# Patient Record
Sex: FEMALE | Race: WHITE | NOT HISPANIC OR LATINO | Employment: FULL TIME | URBAN - METROPOLITAN AREA
[De-identification: names, ages, dates, MRNs, and addresses within clinical notes are randomized per-mention and may not be internally consistent; named-entity substitution may affect disease eponyms.]

---

## 2022-12-17 ENCOUNTER — OFFICE VISIT (OUTPATIENT)
Dept: URGENT CARE | Facility: CLINIC | Age: 42
End: 2022-12-17

## 2022-12-17 VITALS
OXYGEN SATURATION: 98 % | DIASTOLIC BLOOD PRESSURE: 76 MMHG | HEIGHT: 63 IN | SYSTOLIC BLOOD PRESSURE: 127 MMHG | WEIGHT: 200 LBS | BODY MASS INDEX: 35.44 KG/M2 | HEART RATE: 126 BPM | RESPIRATION RATE: 18 BRPM | TEMPERATURE: 101 F

## 2022-12-17 DIAGNOSIS — B34.9 VIRAL ILLNESS: Primary | ICD-10-CM

## 2022-12-17 DIAGNOSIS — R50.9 FEVER, UNSPECIFIED FEVER CAUSE: ICD-10-CM

## 2022-12-17 RX ORDER — ONDANSETRON 4 MG/1
4 TABLET, FILM COATED ORAL EVERY 8 HOURS PRN
Qty: 20 TABLET | Refills: 0 | Status: SHIPPED | OUTPATIENT
Start: 2022-12-17

## 2022-12-17 RX ORDER — OMEPRAZOLE 20 MG/1
CAPSULE, DELAYED RELEASE ORAL
COMMUNITY
Start: 2022-07-21

## 2022-12-17 RX ORDER — OSELTAMIVIR PHOSPHATE 75 MG/1
75 CAPSULE ORAL EVERY 12 HOURS SCHEDULED
Qty: 10 CAPSULE | Refills: 0 | Status: SHIPPED | OUTPATIENT
Start: 2022-12-17 | End: 2022-12-22

## 2022-12-17 RX ORDER — IBUPROFEN 400 MG/1
400 TABLET ORAL ONCE
Status: COMPLETED | OUTPATIENT
Start: 2022-12-17 | End: 2022-12-17

## 2022-12-17 RX ADMIN — IBUPROFEN 400 MG: 400 TABLET ORAL at 13:47

## 2022-12-17 NOTE — PROGRESS NOTES
[facility] [employer] [facility]        NAME: Elin Cortez is a 43 y o  female  : 1980    MRN: [id number]  DATE: 2022  TIME: 1:47 PM    Assessment and Orders   Fever, unspecified fever cause [R50 9]  1  Fever, unspecified fever cause  Covid/Flu-Office Collect    ibuprofen (MOTRIN) tablet 400 mg      2  Viral illness  oseltamivir (TAMIFLU) 75 mg capsule    ondansetron (ZOFRAN) 4 mg tablet            Plan and Discussion      Symptoms and exam consistent with viral illness  Likely Influenza A and since patient started having symptoms today, will treat with Tamiflu x 5 days  Zofran given for nausea  Risks and benefits discussed  Patient understands and agrees with the plan  Follow up with PCP  Chief Complaint     Chief Complaint   Patient presents with   • Fever     Started today with fever sore throat and vomiting headache          History of Present Illness       Fever  This is a new problem  The current episode started today  Associated symptoms include congestion, a fever, nausea, a sore throat and vomiting  Pertinent negatives include no coughing or urinary symptoms  She has tried acetaminophen for the symptoms  The treatment provided mild relief  Review of Systems   Review of Systems   Constitutional: Positive for fever  HENT: Positive for congestion and sore throat  Respiratory: Negative for cough  Gastrointestinal: Positive for nausea and vomiting           Current Medications       Current Outpatient Medications:   •  metFORMIN (GLUCOPHAGE) 500 mg tablet, , Disp: , Rfl:   •  omeprazole (PriLOSEC) 20 mg delayed release capsule, , Disp: , Rfl:   •  ondansetron (ZOFRAN) 4 mg tablet, Take 1 tablet (4 mg total) by mouth every 8 (eight) hours as needed for nausea or vomiting, Disp: 20 tablet, Rfl: 0  •  oseltamivir (TAMIFLU) 75 mg capsule, Take 1 capsule (75 mg total) by mouth every 12 (twelve) hours for 5 days, Disp: 10 capsule, Rfl: 0    Current Facility-Administered Medications:   •  ibuprofen (MOTRIN) tablet 400 mg, 400 mg, Oral, Once, Eve Nolasco DO    Current Allergies     Allergies as of 12/17/2022 - never reviewed   Allergen Reaction Noted   • Meloxicam Swelling 01/11/2019            The following portions of the patient's history were reviewed and updated as appropriate: allergies, current medications, past family history, past medical history, past social history, past surgical history and problem list      No past medical history on file  No past surgical history on file  No family history on file  Medications have been verified  Objective   /76   Pulse (!) 126   Temp (!) 101 °F (38 3 °C)   Resp 18   Ht 5' 3" (1 6 m)   Wt 90 7 kg (200 lb)   LMP 11/24/2022   SpO2 98%   BMI 35 43 kg/m²   Patient's last menstrual period was 11/24/2022  Physical Exam     Physical Exam  Constitutional:       Appearance: She is ill-appearing  HENT:      Head: Normocephalic and atraumatic  Right Ear: Tympanic membrane and external ear normal       Left Ear: Tympanic membrane and external ear normal       Nose: Congestion present  Mouth/Throat:      Pharynx: Posterior oropharyngeal erythema present  Cardiovascular:      Rate and Rhythm: Tachycardia present  Pulmonary:      Effort: Pulmonary effort is normal  No respiratory distress  Breath sounds: No wheezing or rhonchi  Neurological:      General: No focal deficit present  Mental Status: She is alert and oriented to person, place, and time     Psychiatric:         Mood and Affect: Mood normal          Behavior: Behavior normal                Korina Nolasco DO

## 2022-12-19 ENCOUNTER — TELEPHONE (OUTPATIENT)
Dept: URGENT CARE | Facility: CLINIC | Age: 42
End: 2022-12-19

## 2022-12-19 LAB
FLUAV RNA RESP QL NAA+PROBE: NEGATIVE
FLUBV RNA RESP QL NAA+PROBE: NEGATIVE
SARS-COV-2 RNA RESP QL NAA+PROBE: POSITIVE

## 2022-12-19 NOTE — TELEPHONE ENCOUNTER
Accepted a call from the patient  Was calling in regards to a voicemail left about positive lab results  Informed patient that she tested positive for COVID-19  Advised conservative management for symptoms

## 2023-09-25 ENCOUNTER — EVALUATION (OUTPATIENT)
Dept: PHYSICAL THERAPY | Facility: CLINIC | Age: 43
End: 2023-09-25
Payer: OTHER GOVERNMENT

## 2023-09-25 DIAGNOSIS — M17.11 PRIMARY OSTEOARTHRITIS OF RIGHT KNEE: Primary | ICD-10-CM

## 2023-09-25 PROCEDURE — 97161 PT EVAL LOW COMPLEX 20 MIN: CPT

## 2023-09-25 PROCEDURE — 97535 SELF CARE MNGMENT TRAINING: CPT

## 2023-09-25 NOTE — LETTER
2023    Merline Orem, MD  [address] [place] [address]    Patient: Noel Álvarez   YOB: 1980   Date of Visit: 2023     Encounter Diagnosis     ICD-10-CM    1. Primary osteoarthritis of right knee  M17.11           Dear Dr. Caden Ross:    Thank you for your recent referral of Noel Álvarez. Please review the attached evaluation summary from [address] [place] [address] recent visit. Please verify that you agree with the plan of care by signing the attached order. If you have any questions or concerns, please do not hesitate to call. I sincerely appreciate the opportunity to share in the care of one of your patients and hope to have another opportunity to work with you in the near future. Sincerely,    Fide Callahan, PT      Referring Provider:      I certify that I have read the below Plan of Care and certify the need for these services furnished under this plan of treatment while under my care. Merline Orem, MD  [address] [place] [address]  Via Fax: [phone]          PT Evaluation     Today's date: 2023  Patient name: Noel Álvarez  : 1980  MRN: [id number]  Referring provider: Merline Orem, MD  Dx:   Encounter Diagnosis     ICD-10-CM    1. Primary osteoarthritis of right knee  M17.11                      Assessment  Assessment details: Pt is a 37year old female who presents to OP PT s/p R TKR on 23. Upon examination, patient presents with decreased R knee ROM, decreased strength, poor quad contraction, and joint effusion. Due to her current impairments patient has difficulty with transfers, bed mobility, ambulation and functioning at prior level. Pt would benefit from OP PT services in order to address current impairments and functional limitations.  Thank you for your referral!    Impairments: abnormal gait, abnormal or restricted ROM, activity intolerance, impaired balance, impaired physical strength, lacks appropriate home exercise program and pain with function    Goals  STG (to be met within 4 weeks):  1. Pt will improve R knee pain to no more than 2/10 at worst in order to improve gait quality  2. Pt will improve R knee ROM by at least 15 * in order to normalize gait pattern  3. Pt will improve quadricep contraction to good in order to improve stability in SL stance  4. Pt will improve R knee strength by at least 1/2 grade in order to negotiate curb step  5. Pt will ambulate without AD in order to maximize independence  6. Pt to improve FOTO score by at least 10 points in order to progress to PLOF    LTG (to bet met in 10 weeks):  1. Pt will be able to perform all activities without R knee pain in order to maximize independence  2. Pt will restore WFL R knee ROM in order to perform all functional activities  3. Pt will restore WFL R knee strength in order to return to hobbies  4. Pt will be able to ascend/descend 1 flight of stairs with reciprocal gait pattern in order to return PLOF  5. Pt will be able to ambulate on uneven surfaces with least restrictive AD in order to ambulate in the community. 6. Pt to meet FOTO discharge score in order to improve QOL and maximize independence        Plan  Patient would benefit from: skilled physical therapy  Planned modality interventions: thermotherapy: hydrocollator packs and cryotherapy  Planned therapy interventions: joint mobilization, manual therapy, neuromuscular re-education, patient education, strengthening, stretching, therapeutic exercise, home exercise program and balance  Frequency: 2x week  Duration in weeks: 6  Treatment plan discussed with: patient        Subjective Evaluation    History of Present Illness  Mechanism of injury: Pt reports falling off her horse 2.5 years and landing on her feet. A few weeks later she developed R knee pain. Had 3 weeks of OPPT without improvements and was scheduled for MRI.  Underwent steroid injections which gave her fairly good relief for some time. Once these interventions stopped working, she had PRP injections without any relief. Updated imaging revealed severe joint space loss of medial side. She was then scheduled for elective TKR. Pain not well controlled initially post op and had BP/nausea issues. Since being home, she has been moving as much as she can and her pain is doing better. Patient Goals  Patient goals for therapy: increased strength, independence with ADLs/IADLs, return to sport/leisure activities, increased motion, improved balance, decreased pain, decreased edema and return to work  Patient goal: Return to riding horses  Pain  Current pain ratin  At best pain ratin  At worst pain rating: 3  Location: Medial patella  Quality: dull ache and pressure    Treatments  Current treatment: medication and physical therapy        Objective     Observations     Right Knee   Positive for edema and incision. Additional Observation Details  Incision closed with staples, no excessive drainage    Tenderness     Right Knee   Tenderness in the medial joint line and quadriceps tendon.      Neurological Testing     Sensation     Knee   Left Knee   Intact: light touch    Right Knee   Intact: light touch     Active Range of Motion   Left Knee   Normal active range of motion    Right Knee   Flexion: 24 degrees   Extension: 8 degrees     Passive Range of Motion     Right Knee   Flexion: 40 degrees   Extension: 5 degrees     Mobility   Patellar Mobility:     Right Knee   Hypomobile: medial, lateral, superior and inferior     Strength/Myotome Testing     Left Knee   Flexion: 4+  Extension: 4+  Quadriceps contraction: good    Right Knee   Flexion: 3-  Extension: 3-  Quadriceps contraction: poor    Swelling     Left Knee Girth Measurement (cm)   Joint line: 41 cm    Right Knee Girth Measurement (cm)   Joint line: 49 cm            Precautions: R TKR  POC Expiration: 10/25/23  Manuals        PROM        Patellar Mobility        Scar STM        Bilateral HS, hip ABD, piriformis, gastroc, and quad with manual hip traction                  Neuro Re-Ed        Quad set supine        QS c SLR        GS c bridge        Heel slide c OP                        TherEx        NuStep to improve ROM/cardiovasc endurance        Seated knee flexion        Pball DKTC        Knee flexion c FR @ wall        Knee flexion on step                                Instructed HEP & education 10'                       Gait Training                        Modalities

## 2023-09-25 NOTE — PROGRESS NOTES
PT Evaluation     Today's date: 2023  Patient name: Jimmie Nolan  : 1980  MRN: [id number]  Referring provider: Bobby Fraser MD  Dx:   Encounter Diagnosis     ICD-10-CM    1. Primary osteoarthritis of right knee  M17.11                      Assessment  Assessment details: Pt is a 37year old female who presents to OP PT s/p R TKR on 23. Upon examination, patient presents with decreased R knee ROM, decreased strength, poor quad contraction, and joint effusion. Due to her current impairments patient has difficulty with transfers, bed mobility, ambulation and functioning at prior level. Pt would benefit from OP PT services in order to address current impairments and functional limitations. Thank you for your referral!    Impairments: abnormal gait, abnormal or restricted ROM, activity intolerance, impaired balance, impaired physical strength, lacks appropriate home exercise program and pain with function    Goals  STG (to be met within 4 weeks):  1. Pt will improve R knee pain to no more than 2/10 at worst in order to improve gait quality  2. Pt will improve R knee ROM by at least 15 * in order to normalize gait pattern  3. Pt will improve quadricep contraction to good in order to improve stability in SL stance  4. Pt will improve R knee strength by at least 1/2 grade in order to negotiate curb step  5. Pt will ambulate without AD in order to maximize independence  6. Pt to improve FOTO score by at least 10 points in order to progress to PLOF    LTG (to bet met in 10 weeks):  1. Pt will be able to perform all activities without R knee pain in order to maximize independence  2. Pt will restore WFL R knee ROM in order to perform all functional activities  3. Pt will restore WFL R knee strength in order to return to hobbies  4. Pt will be able to ascend/descend 1 flight of stairs with reciprocal gait pattern in order to return PLOF  5.  Pt will be able to ambulate on uneven surfaces with least restrictive AD in order to ambulate in the community. 6. Pt to meet FOTO discharge score in order to improve QOL and maximize independence        Plan  Patient would benefit from: skilled physical therapy  Planned modality interventions: thermotherapy: hydrocollator packs and cryotherapy  Planned therapy interventions: joint mobilization, manual therapy, neuromuscular re-education, patient education, strengthening, stretching, therapeutic exercise, home exercise program and balance  Frequency: 2x week  Duration in weeks: 6  Treatment plan discussed with: patient        Subjective Evaluation    History of Present Illness  Mechanism of injury: Pt reports falling off her horse 2.5 years and landing on her feet. A few weeks later she developed R knee pain. Had 3 weeks of OPPT without improvements and was scheduled for MRI. Underwent steroid injections which gave her fairly good relief for some time. Once these interventions stopped working, she had PRP injections without any relief. Updated imaging revealed severe joint space loss of medial side. She was then scheduled for elective TKR. Pain not well controlled initially post op and had BP/nausea issues. Since being home, she has been moving as much as she can and her pain is doing better. Patient Goals  Patient goals for therapy: increased strength, independence with ADLs/IADLs, return to sport/leisure activities, increased motion, improved balance, decreased pain, decreased edema and return to work  Patient goal: Return to riding horses  Pain  Current pain ratin  At best pain ratin  At worst pain rating: 3  Location: Medial patella  Quality: dull ache and pressure    Treatments  Current treatment: medication and physical therapy        Objective     Observations     Right Knee   Positive for edema and incision.      Additional Observation Details  Incision closed with staples, no excessive drainage    Tenderness     Right Knee   Tenderness in the medial joint line and quadriceps tendon.      Neurological Testing     Sensation     Knee   Left Knee   Intact: light touch    Right Knee   Intact: light touch     Active Range of Motion   Left Knee   Normal active range of motion    Right Knee   Flexion: 24 degrees   Extension: 8 degrees     Passive Range of Motion     Right Knee   Flexion: 40 degrees   Extension: 5 degrees     Mobility   Patellar Mobility:     Right Knee   Hypomobile: medial, lateral, superior and inferior     Strength/Myotome Testing     Left Knee   Flexion: 4+  Extension: 4+  Quadriceps contraction: good    Right Knee   Flexion: 3-  Extension: 3-  Quadriceps contraction: poor    Swelling     Left Knee Girth Measurement (cm)   Joint line: 41 cm    Right Knee Girth Measurement (cm)   Joint line: 49 cm             Precautions: R TKR  POC Expiration: 10/25/23  Manuals 9/25       PROM        Patellar Mobility        Scar STM        Bilateral HS, hip ABD, piriformis, gastroc, and quad with manual hip traction                  Neuro Re-Ed        Quad set supine        QS c SLR        GS c bridge        Heel slide c OP                        TherEx        NuStep to improve ROM/cardiovasc endurance        Seated knee flexion        Pball DKTC        Knee flexion c FR @ wall        Knee flexion on step                                Instructed HEP & education 10'                       Gait Training                        Modalities

## 2023-09-27 NOTE — PROGRESS NOTES
Daily Note     Today's date: 2023  Patient name: Jethro Menendez  : 1980  MRN: [id number]  Referring provider: Thaddeus Guillen MD  Dx:   Encounter Diagnosis     ICD-10-CM    1. Primary osteoarthritis of right knee  M17.11                      Subjective: Pt reports consistently trying to be more active at home      Objective: See treatment diary below      Assessment:  Pt tolerated treatment session fairly well. R knee flexion ROM ~55* this session which is a significant improvement since IE. Fair quad contraction. Ambulating with SPC, adjusted appropriately and instructed in proper gait sequence. HEP updated. Pt would benefit from continued OP PT services. Plan: Continue per plan of care. Precautions: R TKR  POC Expiration: 10/25/23  Manuals       PROM  25'      Patellar Mobility        Scar STM        Bilateral HS, hip ABD, piriformis, gastroc, and quad with manual hip traction                  Neuro Re-Ed        Quad set prone  10x :05      QS c SLR        GS c bridge        Heel slide c OP                        TherEx        NuStep to improve ROM/cardiovasc endurance  10' L1      HR/TR  2x10 4" step      Pball DKTC        Step fwd, lat  6" 10x RLE only ea      Knee flexion on step                                Instructed HEP & education 10'                       Gait Training          Ambulaion with [facility] 10'              Modalities                 Access Code: [email or web address]  URL: [email or web address]  Date: 2023  Prepared by: Earlene Pardo    Exercises  - Prone Quadriceps Set  - 2 x daily - 7 x weekly - 2 sets - 10 reps - :05 hold  - Prone Knee Flexion AROM  - 2 x daily - 7 x weekly - 2 sets - 10 reps - :05 hold  - Standing Knee Flexion  - 2 x daily - 7 x weekly - 2 sets - 10 reps - :05 hold  - Standing Heel Raise  - 2 x daily - 7 x weekly - 2 sets - 10 reps

## 2023-09-28 ENCOUNTER — OFFICE VISIT (OUTPATIENT)
Dept: PHYSICAL THERAPY | Facility: CLINIC | Age: 43
End: 2023-09-28
Payer: OTHER GOVERNMENT

## 2023-09-28 DIAGNOSIS — M17.11 PRIMARY OSTEOARTHRITIS OF RIGHT KNEE: Primary | ICD-10-CM

## 2023-09-28 PROCEDURE — 97140 MANUAL THERAPY 1/> REGIONS: CPT

## 2023-09-28 PROCEDURE — 97110 THERAPEUTIC EXERCISES: CPT

## 2023-09-29 NOTE — PROGRESS NOTES
Daily Note     Today's date: 10/2/2023  Patient name: Karis Ahmadi  : 1980  MRN: [id number]  Referring provider: Dyan Medley MD  Dx:   Encounter Diagnosis     ICD-10-CM    1. Primary osteoarthritis of right knee  M17.11                      Subjective: Pt reports she has been trying to bend her knee more. She notes that she notices good and bad days      Objective: See treatment diary below      Assessment:  Pt tolerated treatment session fair. End range knee flexion continues to be limited due to painful muscle guarding. Session continuing to focus on improving knee flexion ROM and improving gait. Reviewed heel off during gait in order to promote greater ranges of knee fleixon. Pt would benefit from continued OP PT services. Plan: Continue per plan of care. Precautions: R TKR  POC Expiration: 10/25/23  Manuals 9/25 9/28 10/2     PROM  25' 25'     Patellar Mobility        Scar STM        Bilateral HS, hip ABD, piriformis, gastroc, and quad with manual hip traction                  Neuro Re-Ed   10/2     Quad set prone  10x :05 10x :05     QS c SLR        GS c bridge        Heel slide c OP                        TherEx   10/2     NuStep to improve ROM/cardiovasc endurance  10' L1 10'L1     HR/TR  2x10 4" step 2x10 4" step     Pball DKTC        Step fwd, lat  6" 10x RLE only ea 6" 10x RLE only ea     Knee flexion on step                                Instructed HEP & education 10'                       Gait Training   10/2       Ambulaion with [facility] 10' Ambulaion with [facility] 10'             Modalities    10/2             Access Code: [email or web address]  URL: [email or web address]  Date: 2023  Prepared by: Daryle Hives    Exercises  - Prone Quadriceps Set  - 2 x daily - 7 x weekly - 2 sets - 10 reps - :05 hold  - Prone Knee Flexion AROM  - 2 x daily - 7 x weekly - 2 sets - 10 reps - :05 hold  - Standing Knee Flexion  - 2 x daily - 7 x weekly - 2 sets - 10 reps - :05 hold  - Standing Heel Raise  - 2 x daily - 7 x weekly - 2 sets - 10 reps

## 2023-10-02 ENCOUNTER — OFFICE VISIT (OUTPATIENT)
Dept: PHYSICAL THERAPY | Facility: CLINIC | Age: 43
End: 2023-10-02
Payer: OTHER GOVERNMENT

## 2023-10-02 DIAGNOSIS — M17.11 PRIMARY OSTEOARTHRITIS OF RIGHT KNEE: Primary | ICD-10-CM

## 2023-10-02 PROCEDURE — 97110 THERAPEUTIC EXERCISES: CPT

## 2023-10-02 PROCEDURE — 97140 MANUAL THERAPY 1/> REGIONS: CPT

## 2023-10-04 ENCOUNTER — APPOINTMENT (OUTPATIENT)
Dept: PHYSICAL THERAPY | Facility: CLINIC | Age: 43
End: 2023-10-04
Payer: OTHER GOVERNMENT

## 2023-10-06 ENCOUNTER — OFFICE VISIT (OUTPATIENT)
Dept: PHYSICAL THERAPY | Facility: CLINIC | Age: 43
End: 2023-10-06
Payer: OTHER GOVERNMENT

## 2023-10-06 DIAGNOSIS — M17.11 PRIMARY OSTEOARTHRITIS OF RIGHT KNEE: Primary | ICD-10-CM

## 2023-10-06 PROCEDURE — 97110 THERAPEUTIC EXERCISES: CPT

## 2023-10-06 PROCEDURE — 97140 MANUAL THERAPY 1/> REGIONS: CPT

## 2023-10-06 NOTE — PROGRESS NOTES
Daily Note     Today's date: 10/6/2023  Patient name: Maria Teresa Gilliam  : 1980  MRN: [id number]  Referring provider: Grace Espino MD  Dx:   Encounter Diagnosis     ICD-10-CM    1. Primary osteoarthritis of right knee  M17.11                      Subjective: ***      Objective: See treatment diary below      Assessment:  Pt tolerated treatment session ***. Plan: Continue per plan of care. Access Code: [email or web address]  URL: [email or web address]  Date: 2023  Prepared by: Caio Singh    Exercises  - Prone Quadriceps Set  - 2 x daily - 7 x weekly - 2 sets - 10 reps - :05 hold  - Prone Knee Flexion AROM  - 2 x daily - 7 x weekly - 2 sets - 10 reps - :05 hold  - Standing Knee Flexion  - 2 x daily - 7 x weekly - 2 sets - 10 reps - :05 hold  - Standing Heel Raise  - 2 x daily - 7 x weekly - 2 sets - 10 reps

## 2023-10-06 NOTE — LETTER
2023    Steva Gosselin, MD  [address] [place] [address]    Patient: Remington Villarreal   YOB: 1980   Date of Visit: 10/6/2023     Encounter Diagnosis     ICD-10-CM    1. Primary osteoarthritis of right knee  M17.11           Dear Dr. Marcell Yanez:    Thank you for your recent referral of Remington Villarreal. Please review the attached evaluation summary from [address] [place] [address] recent visit. Please verify that you agree with the plan of care by signing the attached order. If you have any questions or concerns, please do not hesitate to call. I sincerely appreciate the opportunity to share in the care of one of your patients and hope to have another opportunity to work with you in the near future. Sincerely,    Jorge A Graham, PT      Referring Provider:      I certify that I have read the below Plan of Care and certify the need for these services furnished under this plan of treatment while under my care. Steva Gosselin, MD  [address] [place] [address]  Via Fax: [phone]          PT Re-Evaluation     Today's date: 10/6/2023  Patient name: Remington Villarreal  : 1980  MRN: [id number]  Referring provider: Steva Gosselin, MD  Dx:   Encounter Diagnosis     ICD-10-CM    1. Primary osteoarthritis of right knee  M17.11                      Assessment  Assessment details: Pt has attended a total of 4 PT sessions and has made fair progress towards goals. She has made some progress regarding knee ROM, knee strength and quad strength. Pt continues to demonstrates decreased strength and ROM. Will consider increasing frequency to 3x/week if ROM does not significantly improve over the next week. Pt would benefit from continued OP PT services in order to address remaining deficits and limitations. Thank you!       Impairments: abnormal gait, abnormal or restricted ROM, activity intolerance, impaired balance, impaired physical strength, lacks appropriate home exercise program and pain with function    Goals  STG (to be met within 4 weeks):  1. Pt will improve R knee pain to no more than 2/10 at worst in order to improve gait quality  progressing  2. Pt will improve R knee ROM by at least 15 * in order to normalize gait pattern   progressing  3. Pt will improve quadricep contraction to good in order to improve stability in SL stance   progressing  4. Pt will improve R knee strength by at least 1/2 grade in order to negotiate curb step  met  5. Pt will ambulate without AD in order to maximize independence  met  6. Pt to improve FOTO score by at least 10 points in order to progress to PLOF   progressing    LTG (to bet met in 10 weeks):  1. Pt will be able to perform all activities without R knee pain in order to maximize independence progressing  2. Pt will restore WFL R knee ROM in order to perform all functional activities  progressing  3. Pt will restore WFL R knee strength in order to return to hobbies  progressing  4. Pt will be able to ascend/descend 1 flight of stairs with reciprocal gait pattern in order to return PLOF  progressing  5. Pt will be able to ambulate on uneven surfaces with least restrictive AD in order to ambulate in the community. progressing  6.  Pt to meet FOTO discharge score in order to improve QOL and maximize independence  progressing        Plan  Patient would benefit from: skilled physical therapy  Planned modality interventions: thermotherapy: hydrocollator packs and cryotherapy  Planned therapy interventions: joint mobilization, manual therapy, neuromuscular re-education, patient education, strengthening, stretching, therapeutic exercise, home exercise program and balance  Frequency: 2x week (Will consider 3x/week if ROM does not prgress to 90*)  Duration in weeks: 6  Treatment plan discussed with: patient        Subjective Evaluation    History of Present Illness  Mechanism of injury: Pt states she has been trying to bend the knee more at home. She has been ambulating without AD  Patient Goals  Patient goals for therapy: increased strength, independence with ADLs/IADLs, return to sport/leisure activities, increased motion, improved balance, decreased pain, decreased edema and return to work  Patient goal: Return to riding horses  Treatments  Current treatment: medication and physical therapy        Objective     Observations     Right Knee   Positive for incision. Additional Observation Details  Staples removed, incision closed    Tenderness     Right Knee   Tenderness in the medial joint line and quadriceps tendon.      Neurological Testing     Sensation     Knee   Left Knee   Intact: light touch    Right Knee   Intact: light touch     Active Range of Motion   Left Knee   Normal active range of motion    Right Knee   Flexion: 37 degrees   Extension: 6 degrees     Passive Range of Motion     Right Knee   Flexion: 64 degrees   Extension: 4 degrees     Mobility   Patellar Mobility:     Right Knee   WFL: medial, lateral, superior and inferior    Strength/Myotome Testing     Left Knee   Flexion: 4+  Extension: 4+  Quadriceps contraction: good    Right Knee   Flexion: 4-  Extension: 3+  Quadriceps contraction: fair    Swelling     Left Knee Girth Measurement (cm)   Joint line: 41 cm    Right Knee Girth Measurement (cm)   Joint line: 49 cm            Precautions: R TKR  POC Expiration: 10/25/23  Manuals 9/25 9/28 10/2 10/6    PROM  25' 25' 25'    Patellar Mobility        Scar STM        Bilateral HS, hip ABD, piriformis, gastroc, and quad with manual hip traction                  Neuro Re-Ed   10/2     Quad set prone  10x :05 10x :05     QS c SLR        GS c bridge        Heel slide c OP                        TherEx   10/2     NuStep to improve ROM/cardiovasc endurance  10' L1 10'L1 10'L1    HR/TR  2x10 4" step 2x10 4" step     Seated knee flexion    2x10 :05 hold    Step fwd, lat  6" 10x RLE only ea 6" 10x RLE only ea     Knee flexion on step    8" step 2x10 :05 hold    FR knee flexion    8" step 2x10 :05 hold                    Instructed HEP & education 10'                       Gait Training   10/2       Ambulaion with [facility] 10' Ambulaion with [facility] 10'             Modalities    10/2             Access Code: [email or web address]  URL: [email or web address]  Date: 09/28/2023  Prepared by: Mick Coombs    Exercises  - Prone Quadriceps Set  - 2 x daily - 7 x weekly - 2 sets - 10 reps - :05 hold  - Prone Knee Flexion AROM  - 2 x daily - 7 x weekly - 2 sets - 10 reps - :05 hold  - Standing Knee Flexion  - 2 x daily - 7 x weekly - 2 sets - 10 reps - :05 hold  - Standing Heel Raise  - 2 x daily - 7 x weekly - 2 sets - 10 reps

## 2023-10-06 NOTE — PROGRESS NOTES
PT Re-Evaluation     Today's date: 10/6/2023  Patient name: Lisa Smith  : 1980  MRN: [id number]  Referring provider: Delicia Garcia MD  Dx:   Encounter Diagnosis     ICD-10-CM    1. Primary osteoarthritis of right knee  M17.11                      Assessment  Assessment details: Pt has attended a total of 4 PT sessions and has made fair progress towards goals. She has made some progress regarding knee ROM, knee strength and quad strength. Pt continues to demonstrates decreased strength and ROM. Will consider increasing frequency to 3x/week if ROM does not significantly improve over the next week. Pt would benefit from continued OP PT services in order to address remaining deficits and limitations. Thank you! Impairments: abnormal gait, abnormal or restricted ROM, activity intolerance, impaired balance, impaired physical strength, lacks appropriate home exercise program and pain with function    Goals  STG (to be met within 4 weeks):  1. Pt will improve R knee pain to no more than 2/10 at worst in order to improve gait quality  progressing  2. Pt will improve R knee ROM by at least 15 * in order to normalize gait pattern   progressing  3. Pt will improve quadricep contraction to good in order to improve stability in SL stance   progressing  4. Pt will improve R knee strength by at least 1/2 grade in order to negotiate curb step  met  5. Pt will ambulate without AD in order to maximize independence  met  6. Pt to improve FOTO score by at least 10 points in order to progress to PLOF   progressing    LTG (to bet met in 10 weeks):  1. Pt will be able to perform all activities without R knee pain in order to maximize independence progressing  2. Pt will restore WFL R knee ROM in order to perform all functional activities  progressing  3. Pt will restore WFL R knee strength in order to return to hobbies  progressing  4.  Pt will be able to ascend/descend 1 flight of stairs with reciprocal gait pattern in order to return PLOF  progressing  5. Pt will be able to ambulate on uneven surfaces with least restrictive AD in order to ambulate in the community. progressing  6. Pt to meet FOTO discharge score in order to improve QOL and maximize independence  progressing        Plan  Patient would benefit from: skilled physical therapy  Planned modality interventions: thermotherapy: hydrocollator packs and cryotherapy  Planned therapy interventions: joint mobilization, manual therapy, neuromuscular re-education, patient education, strengthening, stretching, therapeutic exercise, home exercise program and balance  Frequency: 2x week (Will consider 3x/week if ROM does not prgress to 90*)  Duration in weeks: 6  Treatment plan discussed with: patient        Subjective Evaluation    History of Present Illness  Mechanism of injury: Pt states she has been trying to bend the knee more at home. She has been ambulating without AD  Patient Goals  Patient goals for therapy: increased strength, independence with ADLs/IADLs, return to sport/leisure activities, increased motion, improved balance, decreased pain, decreased edema and return to work  Patient goal: Return to riding horses  Treatments  Current treatment: medication and physical therapy        Objective     Observations     Right Knee   Positive for incision. Additional Observation Details  Staples removed, incision closed    Tenderness     Right Knee   Tenderness in the medial joint line and quadriceps tendon.      Neurological Testing     Sensation     Knee   Left Knee   Intact: light touch    Right Knee   Intact: light touch     Active Range of Motion   Left Knee   Normal active range of motion    Right Knee   Flexion: 37 degrees   Extension: 6 degrees     Passive Range of Motion     Right Knee   Flexion: 64 degrees   Extension: 4 degrees     Mobility   Patellar Mobility:     Right Knee   WFL: medial, lateral, superior and inferior    Strength/Myotome Testing     Left Knee Flexion: 4+  Extension: 4+  Quadriceps contraction: good    Right Knee   Flexion: 4-  Extension: 3+  Quadriceps contraction: fair    Swelling     Left Knee Girth Measurement (cm)   Joint line: 41 cm    Right Knee Girth Measurement (cm)   Joint line: 49 cm             Precautions: R TKR  POC Expiration: 10/25/23  Manuals 9/25 9/28 10/2 10/6    PROM  25' 25' 25'    Patellar Mobility        Scar STM        Bilateral HS, hip ABD, piriformis, gastroc, and quad with manual hip traction                  Neuro Re-Ed   10/2     Quad set prone  10x :05 10x :05     QS c SLR        GS c bridge        Heel slide c OP                        TherEx   10/2     NuStep to improve ROM/cardiovasc endurance  10' L1 10'L1 10'L1    HR/TR  2x10 4" step 2x10 4" step     Seated knee flexion    2x10 :05 hold    Step fwd, lat  6" 10x RLE only ea 6" 10x RLE only ea     Knee flexion on step    8" step 2x10 :05 hold    FR knee flexion    8" step 2x10 :05 hold                    Instructed HEP & education 10'                       Gait Training   10/2       Ambulaion with [facility] 10' Ambulaion with [facility] 10'             Modalities    10/2             Access Code: [email or web address]  URL: [email or web address]  Date: 09/28/2023  Prepared by: Peyton Reyes    Exercises  - Prone Quadriceps Set  - 2 x daily - 7 x weekly - 2 sets - 10 reps - :05 hold  - Prone Knee Flexion AROM  - 2 x daily - 7 x weekly - 2 sets - 10 reps - :05 hold  - Standing Knee Flexion  - 2 x daily - 7 x weekly - 2 sets - 10 reps - :05 hold  - Standing Heel Raise  - 2 x daily - 7 x weekly - 2 sets - 10 reps

## 2023-10-11 ENCOUNTER — OFFICE VISIT (OUTPATIENT)
Dept: PHYSICAL THERAPY | Facility: CLINIC | Age: 43
End: 2023-10-11
Payer: OTHER GOVERNMENT

## 2023-10-11 DIAGNOSIS — M17.11 PRIMARY OSTEOARTHRITIS OF RIGHT KNEE: Primary | ICD-10-CM

## 2023-10-11 PROCEDURE — 97110 THERAPEUTIC EXERCISES: CPT

## 2023-10-11 PROCEDURE — 97140 MANUAL THERAPY 1/> REGIONS: CPT

## 2023-10-11 NOTE — PROGRESS NOTES
Daily Note     Today's date: 10/11/2023  Patient name: Cherelle Solano  : 1980  MRN: [id number]  Referring provider: Shannon Mtz MD  Dx:   Encounter Diagnosis     ICD-10-CM    1. Primary osteoarthritis of right knee  M17.11                      Subjective: Patient reports to PT ready to begin her program.  Patient reports that she feels her R knee is moving better; however, feels like it is a slow process. Objective: See treatment diary below      Assessment: Tolerated treatment well. Patient exhibited good technique with therapeutic exercises and would benefit from continued PT to increase R knee ROM/strength and endurance to improve mobility and gait. Patient able to demonstrate increased R knee flexion c each subsequent visit. PROM R knee flexion today, 72 degrees, measured in prone position. Plan: Continue per plan of care. Precautions: R TKR  POC Expiration: 10/25/23  Manuals 9/25 9/28 10/2 10/6 10/11   PROM  25' 25' 25' 20' c contract relax technique   Patellar Mobility     5'   Scar STM        Bilateral HS, hip ABD, piriformis, gastroc, and quad with manual hip traction       5' R LE           Neuro Re-Ed   10/2  10/11   Quad set prone  10x :05 10x :05     QS c SLR        GS c bridge        Heel slide c OP        Towel Slide on Wall     3x10           TherEx   10/2  10/11   NuStep to improve ROM/cardiovasc endurance  10' L1 10'L1 10'L1 10'L1   HR/TR  2x10 4" step 2x10 4" step     Seated knee flexion    2x10 :05 hold Multiangle MREs 2x15   Step fwd, lat  6" 10x RLE only ea 6" 10x RLE only ea     Knee flexion on step    8" step 2x10 :05 hold 2x10 5"hold 8"step   FR knee flexion    8" step 2x10 :05 hold 5'                     Instructed HEP & education 10'                       Gait Training   10/2  10/11     [place] [facility]' Ambulaion with [facility] 10'             Modalities    10/2  10/11           Access Code: [email or web address]  URL: [email or web address]  Date: Patient Education     Mastitis  Mastitis occurs when breast tissue becomes swollen and inflamed. This is almost always due to infection. Mastitis most often affects breastfeeding women during the first 6 weeks after childbirth. For this reason, it’s also known as lactation mastitis. Infection may happen after a duct becomes clogged, causing milk to back up in the breast. Mastitis may also occur if bacteria enter the breast through small cracks in the nipple. (Less often, mastitis occurs in women who aren’t breastfeeding. If you have mastitis that is not due to breastfeeding, your healthcare provider will give you more information as needed. Treatment may include some of the same home care measures listed below.)  Mastitis may cause flu-like symptoms such as fever, aches, and fatigue. The affected breast may feel painful, warm, tender, firm, or swollen. The skin over the breast may be red (often in a wedge-shaped pattern). You may feel a burning a sensation when breastfeeding.  In most cases, mastitis can be treated with antibiotics. This should clear the infection. If treatment is delayed, a pocket of pus (abscess) can form in the breast tissue. A procedure may then be needed to drain the pus. In severe cases of infection, other treatments may be needed.  Home care  Breastfeeding  · It’s very important to keep the milk flowing from the infected breast. Continue breastfeeding from both breasts as usual. This will not hurt the baby. If this is too painful, use a breast pump to remove milk from the infected side. This can be fed to your baby or discarded. Note: If you don't continue to breastfeed or pump your breast, bacteria can grow in the milk that is left in your breast. This can make your infection worse.  · Tell your healthcare provider if you have problems with breastfeeding. He or she may suggest changes to your technique, if needed. You may also be referred to a lactation nurse or consultant for support with  09/28/2023  Prepared by: Retta Aase    Exercises  - Prone Quadriceps Set  - 2 x daily - 7 x weekly - 2 sets - 10 reps - :05 hold  - Prone Knee Flexion AROM  - 2 x daily - 7 x weekly - 2 sets - 10 reps - :05 hold  - Standing Knee Flexion  - 2 x daily - 7 x weekly - 2 sets - 10 reps - :05 hold  - Standing Heel Raise  - 2 x daily - 7 x weekly - 2 sets - 10 reps breastfeeding.  General care  · Take any medicines you’re prescribed as directed. If you’re taking antibiotics, be sure to complete all of the medicine even if you start to feel better. Over-the-counter pain medicines may also be recommended. Don’t use breast creams or other products or medicines without talking to your healthcare provider first. Note: If you’re concerned about taking medicines while breastfeeding, talk to your healthcare provider.  · Rest as often as needed. Also be sure to drink plenty of fluids.  · To help relieve pain and swelling, heat or ice may be used. Apply as often as directed by your provider.  ? Heat: Place a warm compress on the breast. Use a towel soaked in hot water, a heating pad, or a hot water bottle.  ? Cold: Place a cold compress on the breast. Use an ice pack or bag of ice wrapped in a thin towel. Never place a cold source directly on the skin.  Follow-up care  Follow up with your healthcare provider as advised.  When to seek medical advice  Call your healthcare provider right away if any of these occur:  · Fever of 100.4°F (38°C) or higher, or as directed by your provider  · Shaking chills  · Symptoms worsen or do not improve within 48 to 72 hours of starting treatment  · New symptoms develop  Date Last Reviewed: 7/30/2015  © 0561-9768 [employer]. [address] [place] [address], [place] [address]. All rights reserved. This information is not intended as a substitute for professional medical care. Always follow your healthcare professional's instructions.

## 2023-10-13 ENCOUNTER — OFFICE VISIT (OUTPATIENT)
Dept: PHYSICAL THERAPY | Facility: CLINIC | Age: 43
End: 2023-10-13
Payer: OTHER GOVERNMENT

## 2023-10-13 DIAGNOSIS — M17.11 PRIMARY OSTEOARTHRITIS OF RIGHT KNEE: Primary | ICD-10-CM

## 2023-10-13 PROCEDURE — 97140 MANUAL THERAPY 1/> REGIONS: CPT

## 2023-10-13 PROCEDURE — 97110 THERAPEUTIC EXERCISES: CPT

## 2023-10-13 PROCEDURE — 97112 NEUROMUSCULAR REEDUCATION: CPT

## 2023-10-13 NOTE — PROGRESS NOTES
Daily Note     Today's date: 10/13/2023  Patient name: Jessi Lazcano  : 1980  MRN: [id number]  Referring provider: Darcy Queen MD  Dx:   Encounter Diagnosis     ICD-10-CM    1. Primary osteoarthritis of right knee  M17.11                      Subjective: Patient motivated to get her R knee bending more as she is to return back to work . Objective: See treatment diary below      Assessment: Tolerated treatment well. Patient exhibited good technique with therapeutic exercises and would benefit from continued PT to increase R knee ROM/strength and endurance to improve mobility and gait. Patient was able to demonstrate increased R knee AROM today with the progression of her program.      Plan: Continue per plan of care.       Precautions: R TKR  POC Expiration: 10/25/23  Manuals 9/28 10/2 10/6 10/11 10/13   PROM 25' 25' 25' 20' c contract relax technique 20' c contract relax technique   Patellar Mobility    5' 5'   Scar STM        Bilateral HS, hip ABD, piriformis, gastroc, and quad with manual hip traction      5' R LE 5' RLE           Neuro Re-Ed  10/2  10/11 10/13   Quad set prone 10x :05 10x :05      QS c SLR     Fwd,Lat Lunge   10xea bilat   GS c bridge     SS c Squat 4x10'   Heel slide c OP        Towel Slide on Wall    3x10 5'           TherEx  10/2  10/11 10/13   NuStep to improve ROM/cardiovasc endurance 10' L1 10'L1 10'L1 10'L1 10'L1   HR/TR 2x10 4" step 2x10 4" step      Seated knee flexion   2x10 :05 hold Multiangle MREs 2x15 Multiangle MREs 2x15   Step fwd, lat 6" 10x RLE only ea 6" 10x RLE only ea      Knee flexion on step   8" step 2x10 :05 hold 2x10 5"hold 8"step 2x10 5"hold 14"step   FR knee flexion   8" step 2x10 :05 hold 5'      Leg Press      *NV           Instructed HEP & education                        Gait Training  10/2  10/11 10/13    Ambulaion with [facility] 10 Ambulaion with [facility] 10'              Modalities   10/2  10/11 10/13           Access Code: [email or web address]  URL: [email or web address]  Date: 09/28/2023  Prepared by: Fide Callahan    Exercises  - Prone Quadriceps Set  - 2 x daily - 7 x weekly - 2 sets - 10 reps - :05 hold  - Prone Knee Flexion AROM  - 2 x daily - 7 x weekly - 2 sets - 10 reps - :05 hold  - Standing Knee Flexion  - 2 x daily - 7 x weekly - 2 sets - 10 reps - :05 hold  - Standing Heel Raise  - 2 x daily - 7 x weekly - 2 sets - 10 reps

## 2023-10-17 ENCOUNTER — OFFICE VISIT (OUTPATIENT)
Dept: PHYSICAL THERAPY | Facility: CLINIC | Age: 43
End: 2023-10-17
Payer: OTHER GOVERNMENT

## 2023-10-17 DIAGNOSIS — M17.11 PRIMARY OSTEOARTHRITIS OF RIGHT KNEE: Primary | ICD-10-CM

## 2023-10-17 PROCEDURE — 97140 MANUAL THERAPY 1/> REGIONS: CPT

## 2023-10-17 PROCEDURE — 97112 NEUROMUSCULAR REEDUCATION: CPT

## 2023-10-17 PROCEDURE — 97110 THERAPEUTIC EXERCISES: CPT

## 2023-10-17 NOTE — PROGRESS NOTES
Daily Note     Today's date: 10/17/2023  Patient name: Radha Baltazar  : 1980  MRN: [id number]  Referring provider: Dory Kanner, MD  Dx:   Encounter Diagnosis     ICD-10-CM    1. Primary osteoarthritis of right knee  M17.11                      Subjective: Patient reports that she remains compliant with her HEP. Objective: See treatment diary below      Assessment: Tolerated treatment well. Patient exhibited good technique with therapeutic exercises and would benefit from continued PT to increase R knee ROM/strength and endurance to improve mobility and gait. Patient continues to demonstrate gains c her functional ROM as long as she can minimize her guarding. PROM R knee flexion 70* today in seated and prone. Plan: Continue per plan of care.       Precautions: R TKR  POC Expiration: 10/25/23  Manuals 10/2 10/6 10/11 10/13 10/17   PROM 25' 25' 20' c contract relax technique 20' c contract relax technique 15'   Patellar Mobility   5' 5'    Scar STM        Bilateral HS, hip ABD, piriformis, gastroc, and quad with manual hip traction     5' R LE 5' RLE            Neuro Re-Ed 10/2  10/11 10/13 10/17   Quad set prone 10x :05       Fwd,Lat Lunge    Fwd,Lat Lunge   10xea bilat 2x10ea bilat   Sidestep c Squat    SS c Squat 4x10' 6x10'   Heel slide c OP     [place] Walk 6x10'   Towel Slide on Wall   3x10 5'    Step Up & Over     10xbilat 6"step   [place] Squats     2x10   TherEx 10/2  10/11 10/13 10/17   NuStep to improve ROM/cardiovasc endurance 10'L1 10'L1 10'L1 10'L1 [address] 10' 1/2Revs   HR/TR 2x10 4" step       Seated knee flexion  2x10 :05 hold Multiangle MREs 2x15 Multiangle MREs 2x15    Step fwd, lat 6" 10x RLE only ea       Knee flexion on step  8" step 2x10 :05 hold 2x10 5"hold 8"step 2x10 5"hold 14"step    FR knee flexion  8" step 2x10 :05 hold 5'       Leg Press     *NV NV           Instructed HEP & education                        Gait Training 10/2  10/11 10/13 10/17    Ambulaion with [facility] 10' Modalities  10/2  10/11 10/13 10/17           Access Code: [email or web address]  URL: [email or web address]  Date: 09/28/2023  Prepared by: Matt Byrdifer    Exercises  - Prone Quadriceps Set  - 2 x daily - 7 x weekly - 2 sets - 10 reps - :05 hold  - Prone Knee Flexion AROM  - 2 x daily - 7 x weekly - 2 sets - 10 reps - :05 hold  - Standing Knee Flexion  - 2 x daily - 7 x weekly - 2 sets - 10 reps - :05 hold  - Standing Heel Raise  - 2 x daily - 7 x weekly - 2 sets - 10 reps

## 2023-10-19 ENCOUNTER — OFFICE VISIT (OUTPATIENT)
Dept: PHYSICAL THERAPY | Facility: CLINIC | Age: 43
End: 2023-10-19
Payer: OTHER GOVERNMENT

## 2023-10-19 DIAGNOSIS — M17.11 PRIMARY OSTEOARTHRITIS OF RIGHT KNEE: Primary | ICD-10-CM

## 2023-10-19 PROCEDURE — 97110 THERAPEUTIC EXERCISES: CPT

## 2023-10-19 PROCEDURE — 97140 MANUAL THERAPY 1/> REGIONS: CPT

## 2023-10-19 PROCEDURE — 97112 NEUROMUSCULAR REEDUCATION: CPT

## 2023-10-19 NOTE — PROGRESS NOTES
Daily Note     Today's date: 10/23/2023  Patient name: Flo Floyd  : 1980  MRN: [id number]  Referring provider: Kaiden Iraheta MD  Dx:   Encounter Diagnosis     ICD-10-CM    1. Primary osteoarthritis of right knee  M17.11                      Subjective: Patient reports that she is pleased with her progress; however, frustrated with how slowly she feels she is making the progress. Objective: See treatment diary below      Assessment: Tolerated treatment well. Patient exhibited good technique with therapeutic exercises and would benefit from continued PT to increase R knee ROM/strength and endurance to improve mobility and gait. Patient able to demonstrate and tolerate increase in R knee flexion today, 80 degress. Plan: Continue per plan of care.       Precautions: R TKR  POC Expiration: 23  Manuals 10/11 10/13 10/17 10/19 10/23   PROM 20' c contract relax technique 20' c contract relax technique 15' 15' 15'c STM   Patellar Mobility 5' 5'      Scar STM        Bilateral HS, hip ABD, piriformis, gastroc, and quad with manual hip traction   5' R LE 5' RLE              Neuro Re-Ed 10/11 10/13 10/17 10/19 10/23   Quad set prone        Fwd,Lat Lunge  Fwd,Lat Lunge   10xea bilat 2x10ea bilat 15xea bilat  2x10ea bilat   Sidestep c Squat  SS c Squat 4x10' 6x10'     Heel slide c OP   Monster Walk 6x10'     Towel Slide on Wall 3x10 5'      Step Up & Over   10xbilat 6"step 10xbilat 6"step    Irish Squats   2x10 2x10 c OP 2x10   BOSU March     2'   TherEx 10/11 10/13 10/17 10/19 10/23   NuStep to improve ROM/cardiovasc endurance 10'L1 10'L1 [facility] 10' evs [facility] 10' evs [facility] 10evs   HR/TR    [id number]  4"step [id number] 4"step   Seated knee flexion Multiangle MREs 2x15 Multiangle MREs 2x15      Step fwd, lat   2x10ea bilat 8"step 2x10ea bilat 8"step    Knee flexion on step 2x10 5"hold 8"step 2x10 5"hold 14"step  15x5" 14"step 15x5" 14"step   FR knee flexion 5'      Wall Sit c Tball 2x10 5"hold   Leg Press   *NV #DL 2x10            Instructed HEP & education                        Gait Training 10/11 10/13 10/17 10/19 10/23                   Modalities  10/11 10/13 10/17 10/19 10/23           Access Code: [email or web address]  URL: [email or web address]  Date: 09/28/2023  Prepared by: Antonia Babin    Exercises  - Prone Quadriceps Set  - 2 x daily - 7 x weekly - 2 sets - 10 reps - :05 hold  - Prone Knee Flexion AROM  - 2 x daily - 7 x weekly - 2 sets - 10 reps - :05 hold  - Standing Knee Flexion  - 2 x daily - 7 x weekly - 2 sets - 10 reps - :05 hold  - Standing Heel Raise  - 2 x daily - 7 x weekly - 2 sets - 10 reps

## 2023-10-19 NOTE — PROGRESS NOTES
Daily Note     Today's date: 10/19/2023  Patient name: Lisa Smith  : 1980  MRN: [id number]  Referring provider: Delicia Garcia MD  Dx:   Encounter Diagnosis     ICD-10-CM    1. Primary osteoarthritis of right knee  M17.11                      Subjective: Patient reports to PT ready and enthusiastic to begin her program.  Patient to f/u c  tomorrow. Objective: See treatment diary below      Assessment: Tolerated treatment well. Patient exhibited good technique with therapeutic exercises and would benefit from continued PT to increase R knee ROM/strength and endurance to improve mobility and gait. Patient able to tolerated increased PROM to R knee in supine c her R leg in stretch position off of mat table c 76* of R knee flexion. Plan: Continue per plan of care.       Precautions: R TKR  POC Expiration: 10/25/23  Manuals 10/6 10/11 10/13 10/17 10/19   PROM 25' 20' c contract relax technique 20' c contract relax technique 15' 15'   Patellar Mobility  5' 5'     Scar STM        Bilateral HS, hip ABD, piriformis, gastroc, and quad with manual hip traction    5' R LE 5' RLE             Neuro Re-Ed  10/11 10/13 10/17 10/19   Quad set prone        Fwd,Lat Lunge   Fwd,Lat Lunge   10xea bilat 2x10ea bilat 15xea bilat    Sidestep c Squat   SS c Squat 4x10' 6x10'    Heel slide c OP    Monster Walk 6x10'    Towel Slide on Wall  3x10 5'     Step Up & Over    10xbilat 6"step 10xbilat 6"step   [place] Squats    2x10 2x10 c OP   TherEx  10/11 10/13 10/17 10/19   NuStep to improve ROM/cardiovasc endurance 10'L1 10'L1 10'L1 [address] 10' 1/2Revs [address] 10' 12Revs   HR/TR     [id number]  4"step   Seated knee flexion 2x10 :05 hold Multiangle MREs 2x15 Multiangle MREs 2x15     Step fwd, lat    2x10ea bilat 8"step 2x10ea bilat 8"step   Knee flexion on step 8" step 2x10 :05 hold 2x10 5"hold 8"step 2x10 5"hold 14"step  15x5" 14"step   FR knee flexion 8" step 2x10 :05 hold 5'        Leg Press    *NV #DL 2x10 Instructed HEP & education                        Gait Training  10/11 10/13 10/17 10/19                   Modalities   10/11 10/13 10/17 10/19           Access Code: [email or web address]  URL: [email or web address]  Date: 09/28/2023  Prepared by: Anupama Scales    Exercises  - Prone Quadriceps Set  - 2 x daily - 7 x weekly - 2 sets - 10 reps - :05 hold  - Prone Knee Flexion AROM  - 2 x daily - 7 x weekly - 2 sets - 10 reps - :05 hold  - Standing Knee Flexion  - 2 x daily - 7 x weekly - 2 sets - 10 reps - :05 hold  - Standing Heel Raise  - 2 x daily - 7 x weekly - 2 sets - 10 reps

## 2023-10-20 ENCOUNTER — APPOINTMENT (OUTPATIENT)
Dept: PHYSICAL THERAPY | Facility: CLINIC | Age: 43
End: 2023-10-20
Payer: OTHER GOVERNMENT

## 2023-10-22 NOTE — PROGRESS NOTES
PT Re-Evaluation     Today's date: 10/23/2023  Patient name: Jethro Menendez  : 1980  MRN: [id number]  Referring provider: Tahddeus Guillen MD  Dx:   Encounter Diagnosis     ICD-10-CM    1. Primary osteoarthritis of right knee  M17.11           Start Time: 0800  Stop Time: 09  Total time in clinic (min): 65 minutes    Assessment  Assessment details: Pt has attended a total of 9 PT sessions and is slowly making progress towards goals. Her ROM is progressing but at a slower rate due to muscle guarding and pain levels. She responds much better to mobilization with movement techniques than passive ROM as she is unable to relax when passively moving the extremity. Discussed importance of continuing ROM HEP at home in order to continue making progress. Pt would benefit from continued OP PT services in order to address remaining deficits and limitations. Thank you! Impairments: abnormal gait, abnormal or restricted ROM, activity intolerance, impaired balance, impaired physical strength, lacks appropriate home exercise program and pain with function    Goals  STG (to be met within 4 weeks):  1. Pt will improve R knee pain to no more than 2/10 at worst in order to improve gait quality  partially met  2. Pt will improve R knee ROM by at least 15 * in order to normalize gait pattern  met  3. Pt will improve quadricep contraction to good in order to improve stability in SL stance  progressing  4. Pt will improve R knee strength by at least 1/2 grade in order to negotiate curb step  progressing  5. Pt will ambulate without AD in order to maximize independence  met  6. Pt to improve FOTO score by at least 10 points in order to progress to PLOF  met    LTG (to bet met in 10 weeks):  1. Pt will be able to perform all activities without R knee pain in order to maximize independence   progressing  2. Pt will restore WFL R knee ROM in order to perform all functional activities   progressing   3.  Pt will restore WFL R knee strength in order to return to hobbies  progressing  4. Pt will be able to ascend/descend 1 flight of stairs with reciprocal gait pattern in order to return PLOF  progressing  5. Pt will be able to ambulate on uneven surfaces with least restrictive AD in order to ambulate in the community. progressing  6. Pt to meet FOTO discharge score in order to improve QOL and maximize independence   progressing        Plan  Patient would benefit from: skilled physical therapy  Planned modality interventions: thermotherapy: hydrocollator packs and cryotherapy  Planned therapy interventions: joint mobilization, manual therapy, neuromuscular re-education, patient education, strengthening, stretching, therapeutic exercise, home exercise program and balance  Frequency: 2x week (Increasing frequency to 3x/week)  Duration in weeks: 6  Treatment plan discussed with: patient        Subjective Evaluation    History of Present Illness  Mechanism of injury: Pt reports that she has been consistently performing HEP at home. She is constantly attempting to regain her ROM but is limited in this due to pain. Patient Goals  Patient goals for therapy: increased strength, independence with ADLs/IADLs, return to sport/leisure activities, increased motion, improved balance, decreased pain, decreased edema and return to work  Patient goal: Return to riding horses  Treatments  Current treatment: medication and physical therapy        Objective     Observations     Right Knee   Positive for edema and incision. Additional Observation Details  Incision closed with staples, no excessive drainage    Tenderness     Right Knee   Tenderness in the medial joint line and quadriceps tendon.      Neurological Testing     Sensation     Knee   Left Knee   Intact: Light touch    Right Knee   Intact: light touch     Active Range of Motion   Left Knee   Normal active range of motion    Right Knee   Flexion: 68 degrees   Extension: 4 degrees     Passive Range of Motion     Right Knee   Flexion: 80 degrees   Extension: 1 degrees     Mobility   Patellar Mobility:     Right Knee   WFL: medial and lateral  Hypomobile: superior and inferior     Strength/Myotome Testing     Left Knee   Flexion: 4+  Extension: 4+  Quadriceps contraction: good    Right Knee   Flexion: 3+  Extension: 3+  Quadriceps contraction: fair             Precautions: R TKR  POC Expiration: 11/23/23

## 2023-10-23 ENCOUNTER — EVALUATION (OUTPATIENT)
Dept: PHYSICAL THERAPY | Facility: CLINIC | Age: 43
End: 2023-10-23
Payer: OTHER GOVERNMENT

## 2023-10-23 DIAGNOSIS — M17.11 PRIMARY OSTEOARTHRITIS OF RIGHT KNEE: Primary | ICD-10-CM

## 2023-10-23 PROCEDURE — 97140 MANUAL THERAPY 1/> REGIONS: CPT

## 2023-10-23 PROCEDURE — 97110 THERAPEUTIC EXERCISES: CPT

## 2023-10-23 PROCEDURE — 97112 NEUROMUSCULAR REEDUCATION: CPT

## 2023-10-23 NOTE — LETTER
2023    Mauricio Urbina MD  [address]    Patient: Kesha Delgado   YOB: 1980   Date of Visit: 10/23/2023     Encounter Diagnosis     ICD-10-CM    1. Primary osteoarthritis of right knee  M17.11           Dear Dr. Gloria Desir:    Thank you for your recent referral of [facility]. Please review the attached evaluation summary from [address] [place] [address] recent visit. Please verify that you agree with the plan of care by signing the attached order. If you have any questions or concerns, please do not hesitate to call. I sincerely appreciate the opportunity to share in the care of one of your patients and hope to have another opportunity to work with you in the near future. Sincerely,    Odessa Emerson, PT      Referring Provider:      I certify that I have read the below Plan of Care and certify the need for these services furnished under this plan of treatment while under my care. Mauricio Urbina MD  [address]  Via Fax: [phone]          Daily Note     Today's date: 10/23/2023  Patient name: Kesha Delgado  : 1980  MRN: [id number]  Referring provider: Mauricio Urbina MD  Dx:   Encounter Diagnosis     ICD-10-CM    1. Primary osteoarthritis of right knee  M17.11                      Subjective: Patient reports that she is pleased with her progress; however, frustrated with how slowly she feels she is making the progress. Objective: See treatment diary below      Assessment: Tolerated treatment well. Patient exhibited good technique with therapeutic exercises and would benefit from continued PT to increase R knee ROM/strength and endurance to improve mobility and gait. Patient able to demonstrate and tolerate increase in R knee flexion today, 80 degress. Plan: Continue per plan of care.       Precautions: R TKR  POC Expiration: 23  Manuals 10/11 10/13 10/17 10/19 10/23 PROM 20' c contract relax technique 20' c contract relax technique 15' 15' 15'c STM   Patellar Mobility 5' 5'      Scar STM        Bilateral HS, hip ABD, piriformis, gastroc, and quad with manual hip traction   5' R LE 5' RLE              Neuro Re-Ed 10/11 10/13 10/17 10/19 10/23   Quad set prone        Fwd,Lat Lunge  Fwd,Lat Lunge   10xea bilat 2x10ea bilat 15xea bilat  2x10ea bilat   Sidestep c Squat  SS c Squat 4x10' 6x10'     Heel slide c OP   Monster Walk 6x10'     Towel Slide on Wall 3x10 5'      Step Up & Over   10xbilat 6"step 10xbilat 6"step    [place] Squats   2x10 2x10 c OP 2x10   BOSU March     2'   TherEx 10/11 10/13 10/17 10/19 10/23   NuStep to improve ROM/cardiovasc endurance 10'L1 10'L1 [facility] 10' 1/2Revs [facility]   HR/TR    [id number]  4"step [id number] 4"step   Seated knee flexion Multiangle MREs 2x15 Multiangle MREs 2x15      Step fwd, lat   2x10ea bilat 8"step 2x10ea bilat 8"step    Knee flexion on step 2x10 5"hold 8"step 2x10 5"hold 14"step  15x5" 14"step 15x5" 14"step   FR knee flexion 5'      Wall Sit c Tball 2x10 5"hold   Leg Press   *NV #DL 2x10            Instructed HEP & education                        Gait Training 10/11 10/13 10/17 10/19 10/23                   Modalities  10/11 10/13 10/17 10/19 10/23           Access Code: [email or web address]  URL: [email or web address]  Date: 09/28/2023  Prepared by: Antonia Babin    Exercises  - Prone Quadriceps Set  - 2 x daily - 7 x weekly - 2 sets - 10 reps - :05 hold  - Prone Knee Flexion AROM  - 2 x daily - 7 x weekly - 2 sets - 10 reps - :05 hold  - Standing Knee Flexion  - 2 x daily - 7 x weekly - 2 sets - 10 reps - :05 hold  - Standing Heel Raise  - 2 x daily - 7 x weekly - 2 sets - 10 reps                     Attestation signed by Antonia Babin PT at 10/23/2023 10:01 AM:  I supervised the visit.   We discussed the case to ensure appropriate continuation and progression of care and I reviewed the documentation. PT Re-Evaluation     Today's date: 10/23/2023  Patient name: Logan Gallagher  : 1980  MRN: [id number]  Referring provider: Ivy Duran MD  Dx:   Encounter Diagnosis     ICD-10-CM    1. Primary osteoarthritis of right knee  M17.11           Start Time: 0800  Stop Time: 09  Total time in clinic (min): 65 minutes    Assessment  Assessment details: Pt has attended a total of 9 PT sessions and is slowly making progress towards goals. Her ROM is progressing but at a slower rate due to muscle guarding and pain levels. She responds much better to mobilization with movement techniques than passive ROM as she is unable to relax when passively moving the extremity. Discussed importance of continuing ROM HEP at home in order to continue making progress. Pt would benefit from continued OP PT services in order to address remaining deficits and limitations. Thank you! Impairments: abnormal gait, abnormal or restricted ROM, activity intolerance, impaired balance, impaired physical strength, lacks appropriate home exercise program and pain with function    Goals  STG (to be met within 4 weeks):  1. Pt will improve R knee pain to no more than 2/10 at worst in order to improve gait quality  partially met  2. Pt will improve R knee ROM by at least 15 * in order to normalize gait pattern  met  3. Pt will improve quadricep contraction to good in order to improve stability in SL stance  progressing  4. Pt will improve R knee strength by at least 1/2 grade in order to negotiate curb step  progressing  5. Pt will ambulate without AD in order to maximize independence  met  6. Pt to improve FOTO score by at least 10 points in order to progress to PLOF  met    LTG (to bet met in 10 weeks):  1. Pt will be able to perform all activities without R knee pain in order to maximize independence   progressing  2. Pt will restore WFL R knee ROM in order to perform all functional activities   progressing   3.  Pt will restore WFL R knee strength in order to return to hobbies  progressing  4. Pt will be able to ascend/descend 1 flight of stairs with reciprocal gait pattern in order to return PLOF  progressing  5. Pt will be able to ambulate on uneven surfaces with least restrictive AD in order to ambulate in the community. progressing  6. Pt to meet FOTO discharge score in order to improve QOL and maximize independence   progressing        Plan  Patient would benefit from: skilled physical therapy  Planned modality interventions: thermotherapy: hydrocollator packs and cryotherapy  Planned therapy interventions: joint mobilization, manual therapy, neuromuscular re-education, patient education, strengthening, stretching, therapeutic exercise, home exercise program and balance  Frequency: 2x week (Increasing frequency to 3x/week)  Duration in weeks: 6  Treatment plan discussed with: patient        Subjective Evaluation    History of Present Illness  Mechanism of injury: Pt reports that she has been consistently performing HEP at home. She is constantly attempting to regain her ROM but is limited in this due to pain. Patient Goals  Patient goals for therapy: increased strength, independence with ADLs/IADLs, return to sport/leisure activities, increased motion, improved balance, decreased pain, decreased edema and return to work  Patient goal: Return to riding horses  Treatments  Current treatment: medication and physical therapy        Objective     Observations     Right Knee   Positive for edema and incision. Additional Observation Details  Incision closed with staples, no excessive drainage    Tenderness     Right Knee   Tenderness in the medial joint line and quadriceps tendon.      Neurological Testing     Sensation     Knee   Left Knee   Intact: Light touch    Right Knee   Intact: light touch     Active Range of Motion   Left Knee   Normal active range of motion    Right Knee   Flexion: 68 degrees   Extension: 4 degrees     Passive Range of Motion     Right Knee   Flexion: 80 degrees   Extension: 1 degrees     Mobility   Patellar Mobility:     Right Knee   WFL: medial and lateral  Hypomobile: superior and inferior     Strength/Myotome Testing     Left Knee   Flexion: 4+  Extension: 4+  Quadriceps contraction: good    Right Knee   Flexion: 3+  Extension: 3+  Quadriceps contraction: fair             Precautions: R TKR  POC Expiration: 11/23/23

## 2023-10-23 NOTE — LETTER
2023    Shannon Mtz MD  [address] [place] [address]    Patient: Cherelle Solano   YOB: 1980   Date of Visit: 10/23/2023     Encounter Diagnosis     ICD-10-CM    1. Primary osteoarthritis of right knee  M17.11           Dear Dr. Swapnil Packer:    Thank you for your recent referral of Cherelle Solano. Please review the attached evaluation summary from [address] [place] [address] recent visit. Please verify that you agree with the plan of care by signing the attached order. If you have any questions or concerns, please do not hesitate to call. I sincerely appreciate the opportunity to share in the care of one of your patients and hope to have another opportunity to work with you in the near future. Sincerely,    Mando Cronin PT      Referring Provider:      I certify that I have read the below Plan of Care and certify the need for these services furnished under this plan of treatment while under my care. Shannon Mtz MD  [address] [place] [address]  Via Fax: [phone]          Daily Note     Today's date: 10/23/2023  Patient name: Cherelle Solano  : 1980  MRN: [id number]  Referring provider: Shannon Mtz MD  Dx:   Encounter Diagnosis     ICD-10-CM    1. Primary osteoarthritis of right knee  M17.11                      Subjective: Patient reports that she is pleased with her progress; however, frustrated with how slowly she feels she is making the progress. Objective: See treatment diary below      Assessment: Tolerated treatment well. Patient exhibited good technique with therapeutic exercises and would benefit from continued PT to increase R knee ROM/strength and endurance to improve mobility and gait. Patient able to demonstrate and tolerate increase in R knee flexion today, 80 degress. Plan: Continue per plan of care.       Precautions: R TKR  POC Expiration: 23  Manuals 10/11 10/13 10/17 10/19 10/23 PROM 20' c contract relax technique 20' c contract relax technique 15' 15' 15'c STM   Patellar Mobility 5' 5'      Scar STM        Bilateral HS, hip ABD, piriformis, gastroc, and quad with manual hip traction   5' R LE 5' RLE              Neuro Re-Ed 10/11 10/13 10/17 10/19 10/23   Quad set prone        Fwd,Lat Lunge  Fwd,Lat Lunge   10xea bilat 2x10ea bilat 15xea bilat  2x10ea bilat   Sidestep c Squat  SS c Squat 4x10' 6x10'     Heel slide c OP   Monster Walk 6x10'     Towel Slide on Wall 3x10 5'      Step Up & Over   10xbilat 6"step 10xbilat 6"step    [place] Squats   2x10 2x10 c OP 2x10   BOSU March     2'   TherEx 10/11 10/13 10/17 10/19 10/23   NuStep to improve ROM/cardiovasc endurance 10'L1 10'L1 [facility] 10' 1/2[facility]s [facility]   HR/TR    [id number]  4"step [id number] 4"step   Seated knee flexion Multiangle MREs 2x15 Multiangle MREs 2x15      Step fwd, lat   2x10ea bilat 8"step 2x10ea bilat 8"step    Knee flexion on step 2x10 5"hold 8"step 2x10 5"hold 14"step  15x5" 14"step 15x5" 14"step   FR knee flexion 5'      Wall Sit c Tball 2x10 5"hold   Leg Press   *NV #DL 2x10            Instructed HEP & education                        Gait Training 10/11 10/13 10/17 10/19 10/23                   Modalities  10/11 10/13 10/17 10/19 10/23           Access Code: [email or web address]  URL: [email or web address]  Date: 09/28/2023  Prepared by: Helena Strong    Exercises  - Prone Quadriceps Set  - 2 x daily - 7 x weekly - 2 sets - 10 reps - :05 hold  - Prone Knee Flexion AROM  - 2 x daily - 7 x weekly - 2 sets - 10 reps - :05 hold  - Standing Knee Flexion  - 2 x daily - 7 x weekly - 2 sets - 10 reps - :05 hold  - Standing Heel Raise  - 2 x daily - 7 x weekly - 2 sets - 10 reps                     Attestation signed by Helena Strong PT at 10/23/2023 10:01 AM:  I supervised the visit.   We discussed the case to ensure appropriate continuation and progression of care and I reviewed the documentation. PT Re-Evaluation     Today's date: 10/23/2023  Patient name: Logan Gallagher  : 1980  MRN: [id number]  Referring provider: Ivy Duran MD  Dx:   Encounter Diagnosis     ICD-10-CM    1. Primary osteoarthritis of right knee  M17.11           Start Time: 0800  Stop Time: 09  Total time in clinic (min): 65 minutes    Assessment  Assessment details: Pt has attended a total of 9 PT sessions and is slowly making progress towards goals. Her ROM is progressing but at a slower rate due to muscle guarding and pain levels. She responds much better to mobilization with movement techniques than passive ROM as she is unable to relax when passively moving the extremity. Discussed importance of continuing ROM HEP at home in order to continue making progress. Pt would benefit from continued OP PT services in order to address remaining deficits and limitations. Thank you! Impairments: abnormal gait, abnormal or restricted ROM, activity intolerance, impaired balance, impaired physical strength, lacks appropriate home exercise program and pain with function    Goals  STG (to be met within 4 weeks):  1. Pt will improve R knee pain to no more than 2/10 at worst in order to improve gait quality  partially met  2. Pt will improve R knee ROM by at least 15 * in order to normalize gait pattern  met  3. Pt will improve quadricep contraction to good in order to improve stability in SL stance  progressing  4. Pt will improve R knee strength by at least 1/2 grade in order to negotiate curb step  progressing  5. Pt will ambulate without AD in order to maximize independence  met  6. Pt to improve FOTO score by at least 10 points in order to progress to PLOF  met    LTG (to bet met in 10 weeks):  1. Pt will be able to perform all activities without R knee pain in order to maximize independence   progressing  2. Pt will restore WFL R knee ROM in order to perform all functional activities   progressing   3.  Pt will restore WFL R knee strength in order to return to hobbies  progressing  4. Pt will be able to ascend/descend 1 flight of stairs with reciprocal gait pattern in order to return PLOF  progressing  5. Pt will be able to ambulate on uneven surfaces with least restrictive AD in order to ambulate in the community. progressing  6. Pt to meet FOTO discharge score in order to improve QOL and maximize independence   progressing        Plan  Patient would benefit from: skilled physical therapy  Planned modality interventions: thermotherapy: hydrocollator packs and cryotherapy  Planned therapy interventions: joint mobilization, manual therapy, neuromuscular re-education, patient education, strengthening, stretching, therapeutic exercise, home exercise program and balance  Frequency: 2x week (Increasing frequency to 3x/week)  Duration in weeks: 6  Treatment plan discussed with: patient        Subjective Evaluation    History of Present Illness  Mechanism of injury: Pt reports that she has been consistently performing HEP at home. She is constantly attempting to regain her ROM but is limited in this due to pain. Patient Goals  Patient goals for therapy: increased strength, independence with ADLs/IADLs, return to sport/leisure activities, increased motion, improved balance, decreased pain, decreased edema and return to work  Patient goal: Return to riding horses  Treatments  Current treatment: medication and physical therapy        Objective     Observations     Right Knee   Positive for edema and incision. Additional Observation Details  Incision closed with staples, no excessive drainage    Tenderness     Right Knee   Tenderness in the medial joint line and quadriceps tendon.      Neurological Testing     Sensation     Knee   Left Knee   Intact: Light touch    Right Knee   Intact: light touch     Active Range of Motion   Left Knee   Normal active range of motion    Right Knee   Flexion: 68 degrees   Extension: 4 degrees     Passive Range of Motion     Right Knee   Flexion: 80 degrees   Extension: 1 degrees     Mobility   Patellar Mobility:     Right Knee   WFL: medial and lateral  Hypomobile: superior and inferior     Strength/Myotome Testing     Left Knee   Flexion: 4+  Extension: 4+  Quadriceps contraction: good    Right Knee   Flexion: 3+  Extension: 3+  Quadriceps contraction: fair             Precautions: R TKR  POC Expiration: 11/23/23

## 2023-10-23 NOTE — LETTER
2023    Bobby Fraser MD  [address] [place] [address]    Patient: Jimmie Nolan   YOB: 1980   Date of Visit: 10/23/2023     Encounter Diagnosis     ICD-10-CM    1. Primary osteoarthritis of right knee  M17.11           Dear Dr. Angelita Byrne:    Thank you for your recent referral of Jimmie Nolan. Please review the attached evaluation summary from [address] [place] [address] recent visit. Please verify that you agree with the plan of care by signing the attached order. If you have any questions or concerns, please do not hesitate to call. I sincerely appreciate the opportunity to share in the care of one of your patients and hope to have another opportunity to work with you in the near future. Sincerely,    Elly Stanley, PT      Referring Provider:      I certify that I have read the below Plan of Care and certify the need for these services furnished under this plan of treatment while under my care. Bobby Fraser MD  [address] [place] [address]  Via Fax: [phone]          Daily Note     Today's date: 10/23/2023  Patient name: Jimmie Nolan  : 1980  MRN: [id number]  Referring provider: Bobby Fraser MD  Dx:   Encounter Diagnosis     ICD-10-CM    1. Primary osteoarthritis of right knee  M17.11                      Subjective: Patient reports that she is pleased with her progress; however, frustrated with how slowly she feels she is making the progress. Objective: See treatment diary below      Assessment: Tolerated treatment well. Patient exhibited good technique with therapeutic exercises and would benefit from continued PT to increase R knee ROM/strength and endurance to improve mobility and gait. Patient able to demonstrate and tolerate increase in R knee flexion today, 80 degress. Plan: Continue per plan of care.       Precautions: R TKR  POC Expiration: 23  Manuals 10/11 10/13 10/17 10/19 10/23 PROM 20' c contract relax technique 20' c contract relax technique 15' 15' 15'c STM   Patellar Mobility 5' 5'      Scar STM        Bilateral HS, hip ABD, piriformis, gastroc, and quad with manual hip traction   5' R LE 5' RLE              Neuro Re-Ed 10/11 10/13 10/17 10/19 10/23   Quad set prone        Fwd,Lat Lunge  Fwd,Lat Lunge   10xea bilat 2x10ea bilat 15xea bilat  2x10ea bilat   Sidestep c Squat  SS c Squat 4x10' 6x10'     Heel slide c OP   Monster Walk 6x10'     Towel Slide on Wall 3x10 5'      Step Up & Over   10xbilat 6"step 10xbilat 6"step    [place] Squats   2x10 2x10 c OP 2x10   BOSU March     2'   TherEx 10/11 10/13 10/17 10/19 10/23   NuStep to improve ROM/cardiovasc endurance 10'L1 10'L1 [facility] 10' 2Revs [facility] 2[facility]   HR/TR    [id number]  4"step [id number] 4"step   Seated knee flexion Multiangle MREs 2x15 Multiangle MREs 2x15      Step fwd, lat   2x10ea bilat 8"step 2x10ea bilat 8"step    Knee flexion on step 2x10 5"hold 8"step 2x10 5"hold 14"step  15x5" 14"step 15x5" 14"step   FR knee flexion 5'      Wall Sit c Tball 2x10 5"hold   Leg Press   *NV #DL 2x10            Instructed HEP & education                        Gait Training 10/11 10/13 10/17 10/19 10/23                   Modalities  10/11 10/13 10/17 10/19 10/23           Access Code: [email or web address]  URL: [email or web address]  Date: 2023  Prepared by: Sandi Dyer    Exercises  - Prone Quadriceps Set  - 2 x daily - 7 x weekly - 2 sets - 10 reps - :05 hold  - Prone Knee Flexion AROM  - 2 x daily - 7 x weekly - 2 sets - 10 reps - :05 hold  - Standing Knee Flexion  - 2 x daily - 7 x weekly - 2 sets - 10 reps - :05 hold  - Standing Heel Raise  - 2 x daily - 7 x weekly - 2 sets - 10 reps                     PT Evaluation     Today's date: 10/22/2023  Patient name: La Gutiérrez  : 1980  MRN: [id number]  Referring provider: Obdulio Hurst MD  Dx:   Encounter Diagnosis     ICD-10-CM    1.  Primary osteoarthritis of right knee  M17.11                      Assessment  Assessment details: Pt has attended a total of *** PT sessions and has made *** progress towards goals. *he has made progress regarding ***. Pt continues to have deficits including *** along with having difficulty performing ***. Pt would benefit from continued OP PT services in order to address remaining deficits and limitations. Thank you! Impairments: abnormal gait, abnormal or restricted ROM, activity intolerance, impaired balance, impaired physical strength, lacks appropriate home exercise program and pain with function    Goals  STG (to be met within 4 weeks):  1. Pt will improve R knee pain to no more than 2/10 at worst in order to improve gait quality  2. Pt will improve R knee ROM by at least 15 * in order to normalize gait pattern  3. Pt will improve quadricep contraction to good in order to improve stability in SL stance  4. Pt will improve R knee strength by at least 1/2 grade in order to negotiate curb step  5. Pt will ambulate without AD in order to maximize independence  6. Pt to improve FOTO score by at least 10 points in order to progress to PLOF    LTG (to bet met in 10 weeks):  1. Pt will be able to perform all activities without R knee pain in order to maximize independence  2. Pt will restore WFL R knee ROM in order to perform all functional activities  3. Pt will restore WFL R knee strength in order to return to hobbies  4. Pt will be able to ascend/descend 1 flight of stairs with reciprocal gait pattern in order to return PLOF  5. Pt will be able to ambulate on uneven surfaces with least restrictive AD in order to ambulate in the community.   6. Pt to meet FOTO discharge score in order to improve QOL and maximize independence        Plan  Patient would benefit from: skilled physical therapy  Planned modality interventions: thermotherapy: hydrocollator packs and cryotherapy  Planned therapy interventions: joint mobilization, manual therapy, neuromuscular re-education, patient education, strengthening, stretching, therapeutic exercise, home exercise program and balance  Frequency: 2x week  Duration in weeks: 6  Treatment plan discussed with: patient      Subjective Evaluation    History of Present Illness  Mechanism of injury: Pt reports falling off her horse 2.5 years and landing on her feet. A few weeks later she developed R knee pain. Had 3 weeks of OPPT without improvements and was scheduled for MRI. Underwent steroid injections which gave her fairly good relief for some time. Once these interventions stopped working, she had PRP injections without any relief. Updated imaging revealed severe joint space loss of medial side. She was then scheduled for elective TKR. Pain not well controlled initially post op and had BP/nausea issues. Since being home, she has been moving as much as she can and her pain is doing better. Patient Goals  Patient goals for therapy: increased strength, independence with ADLs/IADLs, return to sport/leisure activities, increased motion, improved balance, decreased pain, decreased edema and return to work  Patient goal: Return to riding horses  Pain  Current pain ratin  At best pain ratin  At worst pain rating: 3  Location: Medial patella  Quality: dull ache and pressure    Treatments  Current treatment: medication and physical therapy      Objective     Observations     Right Knee   Positive for edema and incision. Additional Observation Details  Incision closed with staples, no excessive drainage    Tenderness     Right Knee   Tenderness in the medial joint line and quadriceps tendon.      Neurological Testing     Sensation     Knee   Left Knee   Intact: Light touch    Right Knee   Intact: light touch     Active Range of Motion   Left Knee   Normal active range of motion    Right Knee   Flexion: 24 degrees   Extension: 8 degrees     Passive Range of Motion     Right Knee   Flexion: 40 degrees   Extension: 5 degrees     Mobility   Patellar Mobility:     Right Knee   Hypomobile: medial, lateral, superior and inferior     Strength/Myotome Testing     Left Knee   Flexion: 4+  Extension: 4+  Quadriceps contraction: good    Right Knee   Flexion: 3-  Extension: 3-  Quadriceps contraction: poor    Swelling     Left Knee Girth Measurement (cm)   Joint line: 41 cm    Right Knee Girth Measurement (cm)   Joint line: 49 cm            Precautions: R TKR  POC Expiration: 10/25/23  Manuals 9/25       PROM        Patellar Mobility        Scar STM        Bilateral HS, hip ABD, piriformis, gastroc, and quad with manual hip traction                  Neuro Re-Ed        Quad set supine        QS c SLR        GS c bridge        Heel slide c OP                        TherEx        NuStep to improve ROM/cardiovasc endurance        Seated knee flexion        Pball DKTC        Knee flexion c FR @ wall        Knee flexion on step                                Instructed HEP & education 10'                       Gait Training                        Modalities

## 2023-10-24 NOTE — PROGRESS NOTES
Daily Note     Today's date: 10/25/2023  Patient name: Hilaria Bearden  : 1980  MRN: [id number]  Referring provider: Jerri Lucas MD  Dx:   Encounter Diagnosis     ICD-10-CM    1. Primary osteoarthritis of right knee  M17.11                      Subjective: Patient reports to PT ready to begin her program.  Patient reports that she is trying to be more conscious of allowing her R knee to bend while ambulating. Objective: See treatment diary below      Assessment: Tolerated treatment well. Patient exhibited good technique with therapeutic exercises and would benefit from continued PT to increase R knee ROM/strength and endurance to improve mobility and gait. Patient demonstrates increased R knee flexion while performing her therapeutic exs today. Plan: Continue per plan of care.       Precautions: R TKR  POC Expiration: 23    Manuals 10/13 10/17 10/19 10/23 10/25   PROM 20' c contract relax technique 15' 15' 15'c STM 15' c STM/TFM to inferior scar   Patellar Mobility 5'       Scar STM        Bilateral HS, hip ABD, piriformis, gastroc, and quad with manual hip traction   5' RLE    81*R knee flex           Neuro Re-Ed 10/13 10/17 10/19 10/23 10/25   Quad set prone        Fwd,Lat Lunge Fwd,Lat Lunge   10xea bilat 2x10ea bilat 15xea bilat  2x10ea bilat 2x10ea bilat   Sidestep c Squat SS c Squat 4x10' 6x10'      Heel slide c OP  Monster Walk 6x10'      Towel Slide on Wall 5'       Step Up & Over  10xbilat 6"step 10xbilat 6"step     [place] Squats  2x10 2x10 c OP 2x10 2x10   BOSU March    2'    TherEx 10/13 10/17 10/19 10/23 10/25   NuStep to improve ROM/cardiovasc endurance 10'L1 [facility] 10' 1/2Revs [facility] 10' 1/2Revs [facility] 10' 1/2Revs [facility] 10' 1/2Revs   HR/TR   5i40mfmct  4"step [id number] 4"step [id number] 4"step   Seated knee flexion Multiangle MREs 2x15       Step fwd, lat  2x10ea bilat 8"step 2x10ea bilat 8"step     Knee flexion on step 2x10 5"hold 14"step  15x5" 14"step 15x5" 14"step 10x10" 14"step FR knee flexion    Wall Sit c Tball 2x10 5"hold Wall Sit c Tball 2x10 5"hold   Leg Press  *NV #DL 2x10  105#DL 2x10           Instructed HEP & education                        Gait Training 10/13 10/17 10/19 10/23 10/25                   Modalities  10/13 10/17 10/19 10/23 10/25           Access Code: [email or web address]  URL: [email or web address]  Date: 09/28/2023  Prepared by: Odessa Emerson    Exercises  - Prone Quadriceps Set  - 2 x daily - 7 x weekly - 2 sets - 10 reps - :05 hold  - Prone Knee Flexion AROM  - 2 x daily - 7 x weekly - 2 sets - 10 reps - :05 hold  - Standing Knee Flexion  - 2 x daily - 7 x weekly - 2 sets - 10 reps - :05 hold  - Standing Heel Raise  - 2 x daily - 7 x weekly - 2 sets - 10 reps

## 2023-10-25 ENCOUNTER — OFFICE VISIT (OUTPATIENT)
Dept: PHYSICAL THERAPY | Facility: CLINIC | Age: 43
End: 2023-10-25
Payer: OTHER GOVERNMENT

## 2023-10-25 DIAGNOSIS — M17.11 PRIMARY OSTEOARTHRITIS OF RIGHT KNEE: Primary | ICD-10-CM

## 2023-10-25 PROCEDURE — 97140 MANUAL THERAPY 1/> REGIONS: CPT

## 2023-10-25 PROCEDURE — 97110 THERAPEUTIC EXERCISES: CPT

## 2023-10-25 PROCEDURE — 97112 NEUROMUSCULAR REEDUCATION: CPT

## 2023-10-26 ENCOUNTER — OFFICE VISIT (OUTPATIENT)
Dept: PHYSICAL THERAPY | Facility: CLINIC | Age: 43
End: 2023-10-26
Payer: OTHER GOVERNMENT

## 2023-10-26 DIAGNOSIS — M17.11 PRIMARY OSTEOARTHRITIS OF RIGHT KNEE: Primary | ICD-10-CM

## 2023-10-26 PROCEDURE — 97140 MANUAL THERAPY 1/> REGIONS: CPT

## 2023-10-26 PROCEDURE — 97112 NEUROMUSCULAR REEDUCATION: CPT

## 2023-10-26 PROCEDURE — 97110 THERAPEUTIC EXERCISES: CPT

## 2023-10-26 NOTE — PROGRESS NOTES
Daily Note     Today's date: 10/26/2023  Patient name: Logan Gallagher  : 1980  MRN: [id number]  Referring provider: Ivy Duran MD  Dx:   Encounter Diagnosis     ICD-10-CM    1. Primary osteoarthritis of right knee  M17.11                      Subjective: Patient reports to PT ready and enthusiastic to begin her program this morning. Patient reports that she has been walking more at home trying to focus on bending her knee as she ambulates. Objective: See treatment diary below      Assessment: Tolerated treatment well. Patient exhibited good technique with therapeutic exercises and would benefit from continued PT to increase R knee ROM/strength and endurance to improve mobility and gait. Patient demonstrates a good tolerance to the TFM to inferior scar as less restriction is palpated in the inferior R knee today. Patient demonstrates more ease of R knee ROM today. Plan: Continue per plan of care.       Precautions: R TKR  POC Expiration: 23    Manuals 10/17 10/19 10/23 10/25 10/26   PROM 15' 15' 15'c STM 15' c STM/TFM to inferior scar 15' c    Patellar Mobility        Scar STM        Bilateral HS, hip ABD, piriformis, gastroc, and quad with manual hip traction      81*R knee flex 90*R knee Flex           Neuro Re-Ed 10/17 10/19 10/23 10/25 10/26   Quad set prone        Fwd,Lat Lunge 2x10ea bilat 15xea bilat  2x10ea bilat 2x10ea bilat 2x10ea bilat   Sidestep c Squat 6x10'       Heel slide c OP Monster Walk 6x10'       Towel Slide on Wall        Step Up & Over 10xbilat 6"step 10xbilat 6"step      [place] citizen Squats 2x10 2x10 c OP 2x10 2x10    BOSU '  3'   TherEx 10/17 10/19 10/23 10/25 10/26   NuStep to improve ROM/cardiovasc endurance [facility] 10' 1/2Revs [facility] 10' 1/2Revs [facility] 10' 1/2Revs [facility] 10' 1/2Revs [facility] 10' 1/2Revs   HR/TR  [id number]  4"step [id number] 4"step [id number] 4"step    Seated knee flexion        Step fwd, lat 2x10ea bilat 8"step 2x10ea bilat 8"step      Knee flexion on step  15x5" 14"step 15x5" 14"step 10x10" 14"step 10x10" 14"step   FR knee flexion   Wall Sit c Tball 2x10 5"hold Wall Sit c Tball 2x10 5"hold Wall Sit c Tball 2x10 5"hold   Leg Press  #DL 2x10  85#DL 2x10 85#DL 2x10 c posterior glide           Instructed HEP & education                        Gait Training 10/17 10/19 10/23 10/25 10/26                   Modalities  10/17 10/19 10/23 10/25 10/26           Access Code: [email or web address]  URL: [email or web address]  Date: 09/28/2023  Prepared by: [facility]    Exercises  - Prone Quadriceps Set  - 2 x daily - 7 x weekly - 2 sets - 10 reps - :05 hold  - Prone Knee Flexion AROM  - 2 x daily - 7 x weekly - 2 sets - 10 reps - :05 hold  - Standing Knee Flexion  - 2 x daily - 7 x weekly - 2 sets - 10 reps - :05 hold  - Standing Heel Raise  - 2 x daily - 7 x weekly - 2 sets - 10 reps

## 2023-10-27 NOTE — PROGRESS NOTES
Daily Note     Today's date: 10/30/2023  Patient name: La Gutiérrez  : 1980  MRN: [id number]  Referring provider: Obdulio Hurst MD  Dx:   Encounter Diagnosis     ICD-10-CM    1. Primary osteoarthritis of right knee  M17.11                      Subjective: Patient reports that she did a lot of walking over the weekend and was sore, but feels like she's having good days here and there. "At least I'm getting good days now."      Objective: See treatment diary below      Assessment: Tolerated treatment well. Patient exhibited good technique with therapeutic exercises and would benefit from continued PT to increase R knee ROM/strength and endurance to improve mobility and gait. Plan: Continue per plan of care.       Precautions: R TKR  POC Expiration: 23    Manuals 10/19 10/23 10/25 10/26 10/30   PROM 15' 15'c STM 15' c STM/TFM to inferior scar 15' c  10' c TFM to inferior scar   Patellar Mobility        Scar STM        Bilateral HS, hip ABD, piriformis, gastroc, and quad with manual hip traction     81*R knee flex 90*R knee Flex 94*R knee Flex           Neuro Re-Ed 10/19 10/23 10/25 10/26 10/30   Quad set prone        Fwd,Lat Lunge 15xea bilat  2x10ea bilat 2x10ea bilat 2x10ea bilat 2x10ea bilat   Sidestep c Squat        Heel slide c OP        Towel Slide on Wall        Step Up & Over 10xbilat 6"step       Turk[place] Squats 2x10 c OP 2x10 2x10     BOSU Squat to Mat     2x10   BOSU '  3' 3'   TherEx 10/19 10/23 10/25 10/26 10/30   NuStep to improve ROM/cardiovasc endurance [facility] 10' 1/2Revs [facility] 10' 1/2Revs [facility] 10' 1/2Revs [facility] 10' 1/2Revs [facility] 10' 1/2Revs   HR/TR [id number]  4"step [id number] 4"step [id number] 4"step     Seated knee flexion        Step fwd, lat 2x10ea bilat 8"step       Knee flexion on step 15x5" 14"step 15x5" 14"step 10x10" 14"step 10x10" 14"step 10x10"  14"step   Wall Sit c Tball   Wall Sit c Tball 2x10 5"hold Wall Sit c Tball 2x10 5"hold Wall Sit c Tball 2x10 5"hold 2x10 5"hold Leg Press  105#DL 2x10  85#DL 2x10 85#DL 2x10 c posterior glide 85#DL 2x10 c posterior glide  55#SL 2x10           Instructed HEP & education                        Gait Training 10/19 10/23 10/25 10/26 10/30                   Modalities  10/19 10/23 10/25 10/26 10/30           Access Code: [email or web address]  URL: [email or web address]  Date: 09/28/2023  Prepared by: Hui Lebron    Exercises  - Prone Quadriceps Set  - 2 x daily - 7 x weekly - 2 sets - 10 reps - :05 hold  - Prone Knee Flexion AROM  - 2 x daily - 7 x weekly - 2 sets - 10 reps - :05 hold  - Standing Knee Flexion  - 2 x daily - 7 x weekly - 2 sets - 10 reps - :05 hold  - Standing Heel Raise  - 2 x daily - 7 x weekly - 2 sets - 10 reps

## 2023-10-30 ENCOUNTER — OFFICE VISIT (OUTPATIENT)
Dept: PHYSICAL THERAPY | Facility: CLINIC | Age: 43
End: 2023-10-30
Payer: OTHER GOVERNMENT

## 2023-10-30 DIAGNOSIS — M17.11 PRIMARY OSTEOARTHRITIS OF RIGHT KNEE: Primary | ICD-10-CM

## 2023-10-30 PROCEDURE — 97140 MANUAL THERAPY 1/> REGIONS: CPT

## 2023-10-30 PROCEDURE — 97110 THERAPEUTIC EXERCISES: CPT

## 2023-10-30 PROCEDURE — 97112 NEUROMUSCULAR REEDUCATION: CPT

## 2023-10-30 NOTE — PROGRESS NOTES
Daily Note     Today's date: 10/31/2023  Patient name: Jimmie Nolan  : 1980  MRN: [id number]  Referring provider: Bobby Fraser MD  Dx:   Encounter Diagnosis     ICD-10-CM    1. Primary osteoarthritis of right knee  M17.11                      Subjective: Patient reports that her R knee is feeling stiff today. "I do feel better after PT, I just feel like I can move better the rest of the day."      Objective: See treatment diary below      Assessment: Tolerated treatment well. Patient exhibited good technique with therapeutic exercises and would benefit from continued PT to increase R knee ROM/strength and endurance to improve mobility and gait. R knee ROM continuing to progress as tolerated c soft end feel. Plan: Continue per plan of care.       Precautions: R TKR  POC Expiration: 23    Manuals 10/23 10/25 10/26 10/30 10/31   PROM 15'c STM 15' c STM/TFM to inferior scar 15' c  10' c TFM to inferior scar 10' c TFM to inferior scar   Patellar Mobility        Scar STM        Bilateral HS, hip ABD, piriformis, gastroc, and quad with manual hip traction    81*R knee flex 90*R knee Flex 94*R knee Flex 80* resting flex of R knee           Neuro Re-Ed 10/23 10/25 10/26 10/30 10/31   Quad set prone        Fwd,Lat Lunge 2x10ea bilat 2x10ea bilat 2x10ea bilat 2x10ea bilat 2x10ea bilat   Sidestep c Squat        Heel slide c OP        Towel Slide on Wall        Step Up & Over        [place] Squats 2x10 2x10      BOSU Squat to Mat    2x10 2x10   BOSU March 2'  3' 3' 3'   TherEx 10/23 10/25 10/26 10/30 10/31   NuStep to improve ROM/cardiovasc endurance [facility] 10' full retro revs [facility] 10' full retro revs   HR/TR [id number] 4"step [id number] 4"step      Seated knee flexion        Step fwd, lat        Knee flexion on step 15x5" 14"step 10x10" 14"step 10x10" 14"step 10x10"  14"step 10x10"  14"step   Wall Sit c Tball  Wall Sit c Tball 2x10 5"hold Wall Sit c Tball 2x10 5"hold Wall Sit c Tball 2x10 5"hold 2x10 5"hold 2x10 5"hold   Leg Press   85#DL 2x10 85#DL 2x10 c posterior glide 85#DL 2x10 c posterior glide  55#SL 2x10 85#DL  55#SL  2x10ea           Instructed HEP & education                        Gait Training 10/23 10/25 10/26 10/30 10/31                   Modalities  10/23 10/25 10/26 10/30 10/31           Access Code: [email or web address]  URL: [email or web address]  Date: 09/28/2023  Prepared by: [facility]    Exercises  - Prone Quadriceps Set  - 2 x daily - 7 x weekly - 2 sets - 10 reps - :05 hold  - Prone Knee Flexion AROM  - 2 x daily - 7 x weekly - 2 sets - 10 reps - :05 hold  - Standing Knee Flexion  - 2 x daily - 7 x weekly - 2 sets - 10 reps - :05 hold  - Standing Heel Raise  - 2 x daily - 7 x weekly - 2 sets - 10 reps

## 2023-10-31 ENCOUNTER — OFFICE VISIT (OUTPATIENT)
Dept: PHYSICAL THERAPY | Facility: CLINIC | Age: 43
End: 2023-10-31
Payer: OTHER GOVERNMENT

## 2023-10-31 DIAGNOSIS — M17.11 PRIMARY OSTEOARTHRITIS OF RIGHT KNEE: Primary | ICD-10-CM

## 2023-10-31 PROCEDURE — 97110 THERAPEUTIC EXERCISES: CPT

## 2023-10-31 PROCEDURE — 97140 MANUAL THERAPY 1/> REGIONS: CPT

## 2023-10-31 PROCEDURE — 97112 NEUROMUSCULAR REEDUCATION: CPT

## 2023-11-01 NOTE — PROGRESS NOTES
Daily Note     Today's date: 2023  Patient name: Teresa Starkey  : 1980  MRN: [id number]  Referring provider: Daysi Ellison MD  Dx:   Encounter Diagnosis     ICD-10-CM    1. Primary osteoarthritis of right knee  M17.11                      Subjective: Patient reports that her first day back to work went well. "My knee feels stiff again this morning. I definitely feel better if I exercise in the morning, it loosens things up."      Objective: See treatment diary below      Assessment: Tolerated treatment well. Patient exhibited good technique with therapeutic exercises and would benefit from continued PT to increase R knee ROM/strength and endurance to improve mobility and gait. Plan: Continue per plan of care.       Precautions: R TKR  POC Expiration: 23    Manuals 10/25 10/26 10/30 10/31 11/2   PROM 15' c STM/TFM to inferior scar 15' c  10' c TFM to inferior scar 10' c TFM to inferior scar 15' c TFM to inferior scar   Patellar Mobility        Scar STM        Bilateral HS, hip ABD, piriformis, gastroc, and quad with manual hip traction   81*R knee flex 90*R knee Flex 94*R knee Flex 80* resting flex of R knee 90* R knee Flex           Neuro Re-Ed 10/25 10/26 10/30 10/31 11/2   Quad set prone        Fwd,Lat Lunge 2x10ea bilat 2x10ea bilat 2x10ea bilat 2x10ea bilat 2x10ea bilat    Sidestep c Squat        Heel slide c OP        Towel Slide on Wall        Step Up & Over        [place]eng[place] Squats 2x10       BOSU Squat to Mat   2x10 2x10 2x10   BOSU March  3' 3' 3' 3'   TherEx 10/25 10/26 10/30 10/31 11/2   NuStep to improve ROM/cardiovasc endurance [facility] full retro revs [facility]   full revs   HR/TR [id number] 4"step       Seated knee flexion        Step fwd, lat        Knee flexion on step 10x10" 14"step 10x10" 14"step 10x10"  14"step 10x10"  14"step 10x10" 14"step   Wall Sit c Tball  Wall Sit c Tball 2x10 5"hold Wall Sit c Tball 2x10 5"hold 2x10 5"hold 2x10 5"hold 2x10 5"hold   Leg Press  85#DL 2x10 85#DL 2x10 c posterior glide 85#DL 2x10 c posterior glide  55#SL 2x10 85#DL  55#SL  2x10ea 95#DL  55#SL  2x10ea           Instructed HEP & education                        Gait Training 10/25 10/26 10/30 10/31 11/2                   Modalities  10/25 10/26 10/30 10/31 11/2           Access Code: [email or web address]  URL: [email or web address]  Date: 09/28/2023  Prepared by: Obed Shaffer    Exercises  - Prone Quadriceps Set  - 2 x daily - 7 x weekly - 2 sets - 10 reps - :05 hold  - Prone Knee Flexion AROM  - 2 x daily - 7 x weekly - 2 sets - 10 reps - :05 hold  - Standing Knee Flexion  - 2 x daily - 7 x weekly - 2 sets - 10 reps - :05 hold  - Standing Heel Raise  - 2 x daily - 7 x weekly - 2 sets - 10 reps

## 2023-11-02 ENCOUNTER — OFFICE VISIT (OUTPATIENT)
Dept: PHYSICAL THERAPY | Facility: CLINIC | Age: 43
End: 2023-11-02
Payer: OTHER GOVERNMENT

## 2023-11-02 DIAGNOSIS — M17.11 PRIMARY OSTEOARTHRITIS OF RIGHT KNEE: Primary | ICD-10-CM

## 2023-11-02 PROCEDURE — 97112 NEUROMUSCULAR REEDUCATION: CPT

## 2023-11-02 PROCEDURE — 97140 MANUAL THERAPY 1/> REGIONS: CPT

## 2023-11-02 PROCEDURE — 97110 THERAPEUTIC EXERCISES: CPT

## 2023-11-06 ENCOUNTER — APPOINTMENT (OUTPATIENT)
Dept: PHYSICAL THERAPY | Facility: CLINIC | Age: 43
End: 2023-11-06
Payer: OTHER GOVERNMENT

## 2023-11-07 ENCOUNTER — OFFICE VISIT (OUTPATIENT)
Dept: PHYSICAL THERAPY | Facility: CLINIC | Age: 43
End: 2023-11-07
Payer: OTHER GOVERNMENT

## 2023-11-07 DIAGNOSIS — M17.11 PRIMARY OSTEOARTHRITIS OF RIGHT KNEE: Primary | ICD-10-CM

## 2023-11-07 PROCEDURE — 97140 MANUAL THERAPY 1/> REGIONS: CPT

## 2023-11-07 PROCEDURE — 97110 THERAPEUTIC EXERCISES: CPT

## 2023-11-07 PROCEDURE — 97112 NEUROMUSCULAR REEDUCATION: CPT

## 2023-11-07 NOTE — PROGRESS NOTES
Daily Note     Today's date: 2023  Patient name: Dc Whitley  : 1980  MRN: [id number]  Referring provider: Teresa Yarbrough MD  Dx:   Encounter Diagnosis     ICD-10-CM    1. Primary osteoarthritis of right knee  M17.11                      Subjective: Patient reports that her dr is pleased with her progress from last visit; however, dr recommends manipulation of R knee to be performed tomorrow in hopes of increasing and then maintaining R knee flexion. 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 Patient exhibited good technique with therapeutic exercises and would benefit from continued PT to increase R knee ROM/strength and endurance to improve mobility and gait. Plan: Continue per plan of care.       Precautions: R TKR  POC Expiration: 23    Manuals 10/26 10/30 10/31 11/2 11/7   PROM 15' c  10' c TFM to inferior scar 10' c TFM to inferior scar 15' c TFM to inferior scar 15' c TFM   Patellar Mobility        Scar STM        Bilateral HS, hip ABD, piriformis, gastroc, and quad with manual hip traction   90*R knee Flex 94*R knee Flex 80* resting flex of R knee 90* R knee Flex 86*R knee flex           Neuro Re-Ed 10/26 10/30 10/31 11/2 11/7   Quad set prone        Fwd,Lat Lunge 2x10ea bilat 2x10ea bilat 2x10ea bilat 2x10ea bilat  2x10ea bilat   Sidestep c Squat        Heel slide c OP        Towel Slide on Wall        Step Up & Over        [place] Squats        BOSU Squat to Mat  2x10 2x10 2x10 2x10   BOSU March 3' 3' 3' 3' 3'   TherEx 10/26 10/30 10/31 11/2 11/7   NuStep to improve ROM/cardiovasc endurance [facility] full [facility]   full revs [facility] 10' full revs   HR/TR        Seated knee flexion        Step fwd, lat        Knee flexion on step 10x10" 14"step 10x10"  14"step 10x10"  14"step 10x10" 14"step 10x10" 14"step   Wall Sit c Tball  Wall Sit c Tball 2x10 5"hold 2x10 5"hold 2x10 5"hold 2x10 5"hold 2x10 5"hold   Leg Press  85#DL 2x10 c posterior glide 85#DL 2x10 c posterior glide  55#SL 2x10 85#DL  55#SL  2x10ea 95#DL  55#SL  2x10ea 105#DL  65#SL  2x10ea           Instructed HEP & education                        Gait Training 10/26 10/30 10/31 11/2 11/7                   Modalities  10/26 10/30 10/31 11/2 11/7           Access Code: [email or web address]  URL: [email or web address]  Date: 09/28/2023  Prepared by: Sandi Dyer    Exercises  - Prone Quadriceps Set  - 2 x daily - 7 x weekly - 2 sets - 10 reps - :05 hold  - Prone Knee Flexion AROM  - 2 x daily - 7 x weekly - 2 sets - 10 reps - :05 hold  - Standing Knee Flexion  - 2 x daily - 7 x weekly - 2 sets - 10 reps - :05 hold  - Standing Heel Raise  - 2 x daily - 7 x weekly - 2 sets - 10 reps

## 2023-11-09 ENCOUNTER — EVALUATION (OUTPATIENT)
Dept: PHYSICAL THERAPY | Facility: CLINIC | Age: 43
End: 2023-11-09
Payer: OTHER GOVERNMENT

## 2023-11-09 DIAGNOSIS — M17.11 PRIMARY OSTEOARTHRITIS OF RIGHT KNEE: Primary | ICD-10-CM

## 2023-11-09 PROCEDURE — 97112 NEUROMUSCULAR REEDUCATION: CPT

## 2023-11-09 PROCEDURE — 97140 MANUAL THERAPY 1/> REGIONS: CPT

## 2023-11-09 PROCEDURE — 97110 THERAPEUTIC EXERCISES: CPT

## 2023-11-09 NOTE — PROGRESS NOTES
PT Re-Evaluation     Today's date: 2023  Patient name: Varsha Bradley  : 1980  MRN: [id number]  Referring provider: Rajendra Khan MD  Dx:   Encounter Diagnosis     ICD-10-CM    1. Primary osteoarthritis of right knee  M17.11                      Assessment  Assessment details: Pt returns to OPPT s/p R knee DOM on 23. Per operative note, patient flexing to 85* with full extension before manipulation and passively able to flex to 115*. Today, pt reports quadricep muscle belly soreness, improvements in pain at distal patella. Will participate in PT daily for one week and then transition to 2-3x/week. Thank you! Impairments: abnormal gait, abnormal or restricted ROM, activity intolerance, impaired balance, impaired physical strength, lacks appropriate home exercise program and pain with function    Goals  STG (to be met within 4 weeks):  1. Pt will improve R knee pain to no more than 2/10 at worst in order to improve gait quality  partially met  2. Pt will improve R knee ROM by at least 15 * in order to normalize gait pattern  met  3. Pt will improve quadricep contraction to good in order to improve stability in SL stance  progressing  4. Pt will improve R knee strength by at least 1/2 grade in order to negotiate curb step  progressing  5. Pt will ambulate without AD in order to maximize independence  met  6. Pt to improve FOTO score by at least 10 points in order to progress to PLOF  met    LTG (to bet met in 10 weeks):  1. Pt will be able to perform all activities without R knee pain in order to maximize independence   progressing  2. Pt will restore WFL R knee ROM in order to perform all functional activities   progressing   3. Pt will restore WFL R knee strength in order to return to hobbies  progressing  4. Pt will be able to ascend/descend 1 flight of stairs with reciprocal gait pattern in order to return PLOF  progressing  5.  Pt will be able to ambulate on uneven surfaces with least restrictive AD in order to ambulate in the community. progressing  6. Pt to meet FOTO discharge score in order to improve QOL and maximize independence   progressing        Plan  Patient would benefit from: skilled physical therapy  Planned modality interventions: thermotherapy: hydrocollator packs and cryotherapy  Planned therapy interventions: joint mobilization, manual therapy, neuromuscular re-education, patient education, strengthening, stretching, therapeutic exercise, home exercise program and balance  Frequency: 2x week (Increasing frequency to 3x/week)  Duration in weeks: 6  Treatment plan discussed with: patient        Subjective Evaluation    History of Present Illness  Mechanism of injury: Pt reports quad muscle belly soreness post procedure. Pain improving underneath patella  Patient Goals  Patient goals for therapy: increased strength, independence with ADLs/IADLs, return to sport/leisure activities, increased motion, improved balance, decreased pain, decreased edema and return to work  Patient goal: Return to riding horses  Treatments  Current treatment: medication and physical therapy        Objective     Tenderness     Right Knee   Tenderness in the medial joint line and quadriceps tendon.      Neurological Testing     Sensation     Knee   Left Knee   Intact: Light touch    Right Knee   Intact: light touch     Active Range of Motion   Left Knee   Normal active range of motion    Passive Range of Motion     Right Knee   Flexion: 92 degrees   Extension: 0 degrees     Mobility   Patellar Mobility:     Right Knee   WFL: medial and lateral  Hypomobile: superior and inferior     Strength/Myotome Testing     Left Knee   Flexion: 4+  Extension: 4+  Quadriceps contraction: good    Right Knee   Flexion: 4-  Extension: 4-  Quadriceps contraction: good             Precautions: R TKR  POC Expiration: 12/9/23

## 2023-11-09 NOTE — PROGRESS NOTES
Daily Note     Today's date: 2023  Patient name: Radha Baltazar  : 1980  MRN: [id number]  Referring provider: Dory Kanner, MD  Dx:   Encounter Diagnosis     ICD-10-CM    1. Primary osteoarthritis of right knee  M17.11                      Subjective: Patient reports to PT this morning c c/o soreness in her R thigh and R hamstring due to her manipulation yesterday. "I don't have the sharp pain in my knee anymore."      Objective: See treatment diary below      Assessment: Tolerated treatment well. Patient exhibited good technique with therapeutic exercises and would benefit from continued PT to increase R knee ROM/strength and endurance to improve mobility and gait. Patient able to tolerate her therapeutic ex program well; however, difficulty with end range movement was due to muscle soreness and not R knee pain/joint tightness. Plan: Continue per plan of care.       Precautions: R TKR  POC Expiration: 23    Manuals 10/30 10/31 11/2 11/7 11/9   PROM 10' c TFM to inferior scar 10' c TFM to inferior scar 15' c TFM to inferior scar 15' c TFM 20' c STM to R quad & HS   Patellar Mobility        Scar STM        Bilateral HS, hip ABD, piriformis, gastroc, and quad with manual hip traction   94*R knee Flex 80* resting flex of R knee 90* R knee Flex 86*R knee flex 92* R knee flex           Neuro Re-Ed 10/30 10/31 11/2 11/7 11/9   Quad set prone        Fwd,Lat Lunge 2x10ea bilat 2x10ea bilat 2x10ea bilat  2x10ea bilat 2x10ea bilat   Sidestep c Squat        Heel slide c OP        Towel Slide on Wall        Step Up & Over        [place] Squats        BOSU Squat to Mat 2x10 2x10 2x10 2x10 2x10 c UE assist due to soreness   BOS 3' 3' 3' 3' 3'   TherEx 10/30 10/31 11/2 11/7 11/9   NuStep to improve ROM/cardiovasc endurance [address] 10' full retro revs [address] 10' full retro revs [address] 10'   full revs [address] 10' full revs [address] 10'L1   HR/TR        Seated knee flexion        Step fwd, lat        Knee flexion on step 10x10"  14"step 10x10"  14"step 10x10" 14"step 10x10" 14"step    Wall Sit c Tball  2x10 5"hold 2x10 5"hold 2x10 5"hold 2x10 5"hold 2x10 5"hold   Leg Press  85#DL 2x10 c posterior glide  55#SL 2x10 85#DL  55#SL  2x10ea 95#DL  55#SL  2x10ea 105#DL  65#SL  2x10ea 105#DL  85#SL 2x10ea           Instructed HEP & education                        Gait Training 10/30 10/31 11/2 11/7 11/9                   Modalities  10/30 10/31 11/2 11/7 11/9           Access Code: [email or web address]  URL: [email or web address]  Date: 09/28/2023  Prepared by: Sandi Dyer    Exercises  - Prone Quadriceps Set  - 2 x daily - 7 x weekly - 2 sets - 10 reps - :05 hold  - Prone Knee Flexion AROM  - 2 x daily - 7 x weekly - 2 sets - 10 reps - :05 hold  - Standing Knee Flexion  - 2 x daily - 7 x weekly - 2 sets - 10 reps - :05 hold  - Standing Heel Raise  - 2 x daily - 7 x weekly - 2 sets - 10 reps

## 2023-11-09 NOTE — LETTER
2023    Shalini Tolbert MD  [address] [place] [address],[address] [place] [address]    Patient: Noemy Hughes   YOB: 1980   Date of Visit: 2023     Encounter Diagnosis     ICD-10-CM    1. Primary osteoarthritis of right knee  M17.11           Dear Dr. Doyle Po:    Thank you for your recent referral of Noemy Hughes. Please review the attached evaluation summary from [address] [place] [address] recent visit. Please verify that you agree with the plan of care by signing the attached order. If you have any questions or concerns, please do not hesitate to call. I sincerely appreciate the opportunity to share in the care of one of your patients and hope to have another opportunity to work with you in the near future. Sincerely,    James Gomez, PT      Referring Provider:      I certify that I have read the below Plan of Care and certify the need for these services furnished under this plan of treatment while under my care. Shalini Tolbert MD  [address] [place] [address]  Via Fax: [phone]          Daily Note     Today's date: 2023  Patient name: Noemy Hughes  : 1980  MRN: [id number]  Referring provider: Shalini Tolbert MD  Dx:   Encounter Diagnosis     ICD-10-CM    1. Primary osteoarthritis of right knee  M17.11                      Subjective: Patient reports to PT this morning c c/o soreness in her R thigh and R hamstring due to her manipulation yesterday. "I don't have the sharp pain in my knee anymore."      Objective: See treatment diary below      Assessment: Tolerated treatment well. Patient exhibited good technique with therapeutic exercises and would benefit from continued PT to increase R knee ROM/strength and endurance to improve mobility and gait. Patient able to tolerate her therapeutic ex program well; however, difficulty with end range movement was due to muscle soreness and not R knee pain/joint tightness.        Plan: Continue per plan of care. Precautions: R TKR  POC Expiration: 11/23/23    Manuals 10/30 10/31 11/2 11/7 11/9   PROM 10' c TFM to inferior scar 10' c TFM to inferior scar 15' c TFM to inferior scar 15' c TFM 20' c STM to R quad & HS   Patellar Mobility        Scar STM        Bilateral HS, hip ABD, piriformis, gastroc, and quad with manual hip traction   94*R knee Flex 80* resting flex of R knee 90* R knee Flex 86*R knee flex 92* R knee flex           Neuro Re-Ed 10/30 10/31 11/2 11/7 11/9   Quad set prone        Fwd,Lat Lunge 2x10ea bilat 2x10ea bilat 2x10ea bilat  2x10ea bilat 2x10ea bilat   Sidestep c Squat        Heel slide c OP        Towel Slide on Wall        Step Up & Over        Ivorian Squats        BOSU Squat to Mat 2x10 2x10 2x10 2x10 2x10 c UE assist due to soreness   BOSU March 3' 3' 3' 3' 3'   TherEx 10/30 10/31 11/2 11/7 11/9   NuStep to improve ROM/cardiovasc endurance [address] 10' full retro revs [address] 10' full retro revs [address] 10'   full revs [address] 10' full revs [address] 10'L1   HR/TR        Seated knee flexion        Step fwd, lat        Knee flexion on step 10x10"  14"step 10x10"  14"step 10x10" 14"step 10x10" 14"step    Wall Sit c Tball  2x10 5"hold 2x10 5"hold 2x10 5"hold 2x10 5"hold 2x10 5"hold   Leg Press  85#DL 2x10 c posterior glide  55#SL 2x10 85#DL  55#SL  2x10ea 95#DL  55#SL  2x10ea 105#DL  65#SL  2x10ea 105#DL  85#SL 2x10ea           Instructed HEP & education                        Gait Training 10/30 10/31 11/2 11/7 11/9                   Modalities  10/30 10/31 11/2 11/7 11/9           Access Code: [email or web address]  URL: [email or web address]  Date: 09/28/2023  Prepared by: Sandi Dyer    Exercises  - Prone Quadriceps Set  - 2 x daily - 7 x weekly - 2 sets - 10 reps - :05 hold  - Prone Knee Flexion AROM  - 2 x daily - 7 x weekly - 2 sets - 10 reps - :05 hold  - Standing Knee Flexion  - 2 x daily - 7 x weekly - 2 sets - 10 reps - :05 hold  - Standing Heel Raise  - 2 x daily - 7 x weekly - 2 sets - 10 reps                           Attestation signed by Caio Singh PT at 2023 10:39 AM:  I supervised the visit. We discussed the case to ensure appropriate continuation and progression of care and I reviewed the documentation. PT Re-Evaluation     Today's date: 2023  Patient name: Maria Teresa Gilliam  : 1980  MRN: [id number]  Referring provider: Grace Espino MD  Dx:   Encounter Diagnosis     ICD-10-CM    1. Primary osteoarthritis of right knee  M17.11                      Assessment  Assessment details: Pt returns to OPPT s/p R knee DOM on 23. Per operative note, patient flexing to 85* with full extension before manipulation and passively able to flex to 115*. Today, pt reports quadricep muscle belly soreness, improvements in pain at distal patella. Will participate in PT daily for one week and then transition to 2-3x/week. Thank you! Impairments: abnormal gait, abnormal or restricted ROM, activity intolerance, impaired balance, impaired physical strength, lacks appropriate home exercise program and pain with function    Goals  STG (to be met within 4 weeks):  1. Pt will improve R knee pain to no more than 2/10 at worst in order to improve gait quality  partially met  2. Pt will improve R knee ROM by at least 15 * in order to normalize gait pattern  met  3. Pt will improve quadricep contraction to good in order to improve stability in SL stance  progressing  4. Pt will improve R knee strength by at least 1/2 grade in order to negotiate curb step  progressing  5. Pt will ambulate without AD in order to maximize independence  met  6. Pt to improve FOTO score by at least 10 points in order to progress to PLOF  met    LTG (to bet met in 10 weeks):  1. Pt will be able to perform all activities without R knee pain in order to maximize independence   progressing  2. Pt will restore WFL R knee ROM in order to perform all functional activities   progressing   3.  Pt will restore WFL R knee strength in order to return to hobbies  progressing  4. Pt will be able to ascend/descend 1 flight of stairs with reciprocal gait pattern in order to return PLOF  progressing  5. Pt will be able to ambulate on uneven surfaces with least restrictive AD in order to ambulate in the community. progressing  6. Pt to meet FOTO discharge score in order to improve QOL and maximize independence   progressing        Plan  Patient would benefit from: skilled physical therapy  Planned modality interventions: thermotherapy: hydrocollator packs and cryotherapy  Planned therapy interventions: joint mobilization, manual therapy, neuromuscular re-education, patient education, strengthening, stretching, therapeutic exercise, home exercise program and balance  Frequency: 2x week (Increasing frequency to 3x/week)  Duration in weeks: 6  Treatment plan discussed with: patient        Subjective Evaluation    History of Present Illness  Mechanism of injury: Pt reports quad muscle belly soreness post procedure. Pain improving underneath patella  Patient Goals  Patient goals for therapy: increased strength, independence with ADLs/IADLs, return to sport/leisure activities, increased motion, improved balance, decreased pain, decreased edema and return to work  Patient goal: Return to riding horses  Treatments  Current treatment: medication and physical therapy        Objective     Tenderness     Right Knee   Tenderness in the medial joint line and quadriceps tendon.      Neurological Testing     Sensation     Knee   Left Knee   Intact: Light touch    Right Knee   Intact: light touch     Active Range of Motion   Left Knee   Normal active range of motion    Passive Range of Motion     Right Knee   Flexion: 92 degrees   Extension: 0 degrees     Mobility   Patellar Mobility:     Right Knee   WFL: medial and lateral  Hypomobile: superior and inferior     Strength/Myotome Testing     Left Knee   Flexion: 4+  Extension: 4+  Quadriceps contraction: good    Right Knee   Flexion: 4-  Extension: 4-  Quadriceps contraction: good             Precautions: R TKR  POC Expiration: 12/9/23

## 2023-11-10 ENCOUNTER — OFFICE VISIT (OUTPATIENT)
Dept: PHYSICAL THERAPY | Facility: CLINIC | Age: 43
End: 2023-11-10
Payer: OTHER GOVERNMENT

## 2023-11-10 DIAGNOSIS — M17.11 PRIMARY OSTEOARTHRITIS OF RIGHT KNEE: Primary | ICD-10-CM

## 2023-11-10 PROCEDURE — 97110 THERAPEUTIC EXERCISES: CPT

## 2023-11-10 PROCEDURE — 97535 SELF CARE MNGMENT TRAINING: CPT

## 2023-11-10 PROCEDURE — 97140 MANUAL THERAPY 1/> REGIONS: CPT

## 2023-11-10 NOTE — PROGRESS NOTES
Daily Note     Today's date: 11/10/2023  Patient name: Gopi Bills  : 1980  MRN: [id number]  Referring provider: Kurtis Beatty MD  Dx:   Encounter Diagnosis     ICD-10-CM    1. Primary osteoarthritis of right knee  M17.11                      Subjective: Pt reports continued soreness primarily in R quad and medial/distal hamstring      Objective: See treatment diary below      Assessment:  Pt tolerated treatment session fairly well. Continuing with daily PT post DOM on . Quad discomfort and soreness limiting further ROM. HEP updated. Pt would benefit from continued OP PT services. Plan: Continue per plan of care.       Precautions: R TKR  POC Expiration: 23    Manuals 11/10  11/2 11/7 11/9   PROM 25' c STM to R quad & HS  15' c TFM to inferior scar 15' c TFM 20' c STM to R quad & HS   Patellar Mobility        Scar STM        Bilateral HS, hip ABD, piriformis, gastroc, and quad with manual hip traction   92* R knee flex  90* R knee Flex 86*R knee flex 92* R knee flex           Neuro Re-Ed      Quad set prone Seated pball rolls, standing pball rolles 2x10 ea       Fwd,Lat Lunge   2x10ea bilat  2x10ea bilat 2x10ea bilat   BOSU Squat to Mat   2x10 2x10 2x10 c UE assist due to soreness   BOS   3' 3' 3'   [place]  *      Kneeling   *              TherEx      NuStep to improve ROM/cardiovasc endurance [facility] 10'L1  [facility] 10'   full revs [facility] 10' full [facility] 10'L1   Knee flexion on step   10x10" 14"step 10x10" 14"step    Wall Sit c Tball   * 2x10 5"hold 2x10 5"hold 2x10 5"hold   Leg Press   * 95#DL  55#SL  2x10ea 105#DL  65#SL  2x10ea 105#DL  85#SL 2x10ea           Instructed HEP & education 15'                       Gait Training                      Modalities        MHP R leg seated 10'

## 2023-11-13 ENCOUNTER — OFFICE VISIT (OUTPATIENT)
Dept: PHYSICAL THERAPY | Facility: CLINIC | Age: 43
End: 2023-11-13
Payer: OTHER GOVERNMENT

## 2023-11-13 DIAGNOSIS — M17.11 PRIMARY OSTEOARTHRITIS OF RIGHT KNEE: Primary | ICD-10-CM

## 2023-11-13 PROCEDURE — 97140 MANUAL THERAPY 1/> REGIONS: CPT

## 2023-11-13 PROCEDURE — 97110 THERAPEUTIC EXERCISES: CPT

## 2023-11-13 PROCEDURE — 97112 NEUROMUSCULAR REEDUCATION: CPT

## 2023-11-13 NOTE — PROGRESS NOTES
Daily Note     Today's date: 2023  Patient name: Dc Whitley  : 1980  MRN: [id number]  Referring provider: Teresa Yarbrough MD  Dx:   Encounter Diagnosis     ICD-10-CM    1. Primary osteoarthritis of right knee  M17.11                      Subjective: Patient reports stiffness in her R knee this morning. "The stiffness is getting old."      Objective: See treatment diary below      Assessment: Tolerated treatment well. Patient exhibited good technique with therapeutic exercises and would benefit from continued PT to increase R knee ROM/strength and endurance to improve mobility and gait. Plan: Continue per plan of care.       Precautions: R TKR  POC Expiration: 23    Manuals 11/10 11/13  11/7 11/9   PROM 25' c STM to R quad & HS 15'c STM to R quad & HS  15' c TFM 20' c STM to R quad & HS   Patellar Mobility        Scar STM        Bilateral HS, hip ABD, piriformis, gastroc, and quad with manual hip traction   92* R knee flex   86*R knee flex 92* R knee flex           Neuro Re-Ed     Quad set prone Seated pball rolls, standing pball rolles 2x10 ea       Fwd,Lat Lunge    2x10ea bilat 2x10ea bilat   BOSU Squat to Mat    2x10 2x10 c UE assist due to soreness   BOS    3' 3'   [place]  3'      Kneeling   5' c prolonged flex hold              TherEx     NuStep to improve ROM/cardiovasc endurance [address] 10'L1 [address] 10'L1  [address] 10' full revs [address] 10'L1   Knee flexion on step    10x10" 14"step    Wall Sit c Tball   2x10 5"hold  2x10 5"hold 2x10 5"hold   Leg Press   105#DL  85#SL  2x10ea   105#DL  65#SL  2x10ea 105#DL  85#SL 2x10ea           Instructed HEP & education 15'                       Gait Training                     Modalities       MHP R leg seated 10' 10'MH to R knee in seated

## 2023-11-14 ENCOUNTER — OFFICE VISIT (OUTPATIENT)
Dept: PHYSICAL THERAPY | Facility: CLINIC | Age: 43
End: 2023-11-14
Payer: OTHER GOVERNMENT

## 2023-11-14 DIAGNOSIS — M17.11 PRIMARY OSTEOARTHRITIS OF RIGHT KNEE: Primary | ICD-10-CM

## 2023-11-14 PROCEDURE — 97140 MANUAL THERAPY 1/> REGIONS: CPT

## 2023-11-14 PROCEDURE — 97110 THERAPEUTIC EXERCISES: CPT

## 2023-11-14 PROCEDURE — 97112 NEUROMUSCULAR REEDUCATION: CPT

## 2023-11-14 NOTE — PROGRESS NOTES
Daily Note     Today's date: 2023  Patient name: Summer Sy  : 1980  MRN: [id number]  Referring provider: Anum Guerra MD  Dx:   Encounter Diagnosis     ICD-10-CM    1. Primary osteoarthritis of right knee  M17.11                      Subjective: Patient reports that she feels good after therapy, but by the end of the day her R knee feels swollen and stiff. Objective: See treatment diary below      Assessment: Tolerated treatment well. Patient exhibited good technique with therapeutic exercises and would benefit from continued PT to increase R knee ROM/strength and endurance to improve mobility and gait. Plan: Continue per plan of care.       Precautions: R TKR  POC Expiration: 23    Manuals 11/10 11/13 11/14  11/9   PROM 25' c STM to R quad & HS 15'c STM to R quad & HS 15'c STM to R quad & HS  20' c STM to R quad & HS   Patellar Mobility        Scar STM        Bilateral HS, hip ABD, piriformis, gastroc, and quad with manual hip traction   92* R knee flex    92* R knee flex           Neuro Re-Ed     Quad set prone Seated pball rolls, standing pball rolles 2x10 ea       Fwd,Lat Lunge   High Lunge a mat w/ knee flexion hold  2x10ea bilat   BOSU Squat to Mat     2x10 c UE assist due to soreness   BOSU March     3'   [place]  3' 3'     Kneeling   5' c prolonged flex hold 5' c prolonged flex hold             TherEx     NuStep to improve ROM/cardiovasc endurance [address] 10'L1 [address] 10'L1 [address] 10'L1  [address] 10'L1   Knee flexion on step        Wall Sit c Tball   2x10 5"hold 2x10 5"hold  2x10 5"hold   Leg Press   105#DL  85#SL  2x10ea  105#DL  85#SL  2x10ea  105#DL  85#SL 2x10ea           Instructed HEP & education 15'                       Gait Training                     Modalities       MHP R leg seated 10' 10'MH to R knee in seated  10'MH to R knee in seated

## 2023-11-15 NOTE — PROGRESS NOTES
Daily Note     Today's date: 2023  Patient name: La Gutiérrez  : 1980  MRN: [id number]  Referring provider: Obdulio Hurst MD  Dx:   Encounter Diagnosis     ICD-10-CM    1. Primary osteoarthritis of right knee  M17.11                      Subjective: Patient reports that she is having a better day today. Patient continues to be compliant with her HEP and has been educating herself on progression of her recovery. Objective: See treatment diary below      Assessment: Tolerated treatment well. Patient exhibited good technique with therapeutic exercises and would benefit from continued PT to increase R knee ROM/strength and endurance to improve mobility and gait. Patient able to demonstrate 90* of R knee flexion in prone position c strap stretch. Plan: Continue per plan of care.       Precautions: R TKR  POC Expiration: 23    Manuals 11/10 11/13 11/14 11/16    PROM 25' c STM to R quad & HS 15'c STM to R quad & HS 15'c STM to R quad & HS 15'c STM to R distal quad     Patellar Mobility        Scar STM        Bilateral HS, hip ABD, piriformis, gastroc, and quad with manual hip traction   92* R knee flex               Neuro Re-Ed      Quad set prone Seated pball rolls, standing pball rolles 2x10 ea   Prone R knee flexion stretch c strap 5'    Fwd,Lat Lunge   High Lunge a mat w/ knee flexion hold High lunge at mat 3'    BOSU Squat to Mat        BOSU [facility] Pokey  3' 3' 3'    Kneeling   5' c prolonged flex hold 5' c prolonged flex hold             TherEx      NuStep to improve ROM/cardiovasc endurance [address] 10'L1 [address] 10'L1 [address] 10'L1 [address] 10'L1    Knee flexion on step        Wall Sit c Tball   2x10 5"hold 2x10 5"hold 2x10 5"hold    Leg Press   105#DL  85#SL  2x10ea  105#DL  85#SL  2x10ea 105#DL  85#SL  2x10ea            Instructed HEP & education 15'                       Gait Training                      Modalities    11/16     MHP R leg seated 10' 10'MH to R knee in seated  10'MH to R knee in seated 10'MH to R knee in seated

## 2023-11-16 ENCOUNTER — OFFICE VISIT (OUTPATIENT)
Dept: PHYSICAL THERAPY | Facility: CLINIC | Age: 43
End: 2023-11-16
Payer: OTHER GOVERNMENT

## 2023-11-16 DIAGNOSIS — M17.11 PRIMARY OSTEOARTHRITIS OF RIGHT KNEE: Primary | ICD-10-CM

## 2023-11-16 PROCEDURE — 97110 THERAPEUTIC EXERCISES: CPT

## 2023-11-16 PROCEDURE — 97112 NEUROMUSCULAR REEDUCATION: CPT

## 2023-11-16 PROCEDURE — 97140 MANUAL THERAPY 1/> REGIONS: CPT

## 2023-11-20 ENCOUNTER — APPOINTMENT (OUTPATIENT)
Dept: PHYSICAL THERAPY | Facility: CLINIC | Age: 43
End: 2023-11-20
Payer: OTHER GOVERNMENT

## 2023-11-21 ENCOUNTER — APPOINTMENT (OUTPATIENT)
Dept: PHYSICAL THERAPY | Facility: CLINIC | Age: 43
End: 2023-11-21
Payer: OTHER GOVERNMENT

## 2023-11-21 NOTE — PROGRESS NOTES
Daily Note     Today's date: 2023  Patient name: Remington Villarreal  : 1980  MRN: [id number]  Referring provider: Steva Gosselin, MD  Dx:   Encounter Diagnosis     ICD-10-CM    1. Primary osteoarthritis of right knee  M17.11                      Subjective: Patient reports to PT ready to begin her program this morning. "Monday was the best day I had so far. I still do get stiffness and swelling by the end of the day, but I feel like I'm moving in the right direction."      Objective: See treatment diary below      Assessment: Tolerated treatment well. Patient exhibited good technique with therapeutic exercises and would benefit from continued PT to increase R knee ROM/strength and endurance to improve mobility and gait. R knee ROM continues to move more freely with a soft end feel. Plan: Continue per plan of care.       Precautions: R TKR  POC Expiration: 23    Manuals 11/10 11/13 11/14 11/16 11/22   PROM 25' c STM to R quad & HS 15'c STM to R quad & HS 15'c STM to R quad & HS 15'c STM to R distal quad  15'c STM to R distal quad   Patellar Mobility        Scar STM        Bilateral HS, hip ABD, piriformis, gastroc, and quad with manual hip traction   92* R knee flex               Neuro Re-Ed     Quad set prone Seated pball rolls, standing pball rolles 2x10 ea   Prone R knee flexion stretch c strap 5' Prone R knee flexion stretch c strap 5'   Fwd,Lat Lunge   High Lunge a mat w/ knee flexion hold High lunge at mat 3' 2x10ea F&L   BOSU Squat to [facility] Pokey  3' 3' 3'    Kneeling   5' c prolonged flex hold 5' c prolonged flex hold             TherEx     NuStep to improve ROM/cardiovasc endurance [address] 10'L1 [address] 10'L1 [address] 10'L1 [address] 10'L1 [address] 10'L1   Knee flexion on step        Wall Sit c Tball   2x10 5"hold 2x10 5"hold 2x10 5"hold    Leg Press   105#DL  85#SL  2x10ea  105#DL  85#SL  2x10ea 105#DL  85#SL  2x10ea Instructed HEP & education 15'                       Gait Training  11/13 11/14 11/16 11/22                   Modalities   11/13 11/14 11/16 11/22    MHP R leg seated 10' 10'MH to R knee in seated  10'MH to R knee in seated 10'MH to R knee in seated 10'MH to   R knee in seated

## 2023-11-22 ENCOUNTER — OFFICE VISIT (OUTPATIENT)
Dept: PHYSICAL THERAPY | Facility: CLINIC | Age: 43
End: 2023-11-22
Payer: OTHER GOVERNMENT

## 2023-11-22 DIAGNOSIS — M17.11 PRIMARY OSTEOARTHRITIS OF RIGHT KNEE: Primary | ICD-10-CM

## 2023-11-22 PROCEDURE — 97110 THERAPEUTIC EXERCISES: CPT

## 2023-11-22 PROCEDURE — 97112 NEUROMUSCULAR REEDUCATION: CPT

## 2023-11-22 PROCEDURE — 97140 MANUAL THERAPY 1/> REGIONS: CPT

## 2023-11-22 NOTE — PROGRESS NOTES
Daily Note     Today's date: 2023  Patient name: Summer Sy  : 1980  MRN: [id number]  Referring provider: Anum Guerra MD  Dx:   Encounter Diagnosis     ICD-10-CM    1. Primary osteoarthritis of right knee  M17.11                      Subjective: Patient reports that she continues to be compliant with her HEP.  "I've really been trying to massage the sore areas and that seems to be helping my knee to feel and move better."  Patient enthusiastic to be able to return to horseback riding. Objective: See treatment diary below      Assessment: Tolerated treatment well. Patient exhibited good technique with therapeutic exercises and would benefit from continued PT to increase R knee ROM/strength and endurance to improve mobility and gait. Patient able to demonstrate a good tolerance to the progression of her program.        Plan: Continue per plan of care.       Precautions: R TKR  POC Expiration: 23    Manuals    PROM 15'c STM to R quad & HS 15'c STM to R quad & HS 15'c STM to R distal quad  15'c STM to R distal quad 15'c STM to R distal quad   Patellar Mobility        Scar STM        Bilateral HS, hip ABD, piriformis, gastroc, and quad with manual hip traction                  Neuro Re-Ed    Quad set prone   Prone R knee flexion stretch c strap 5' Prone R knee flexion stretch c strap 5'    Fwd,Lat Lunge  High Lunge a mat w/ knee flexion hold High lunge at mat 3' 2x10ea F&L 2x10ea F&L   BOSU Squat to Mat        BOSU [facility] Pokey 3' 3' 3'     Kneeling  5' c prolonged flex hold 5' c prolonged flex hold              TherEx    NuStep to improve ROM/cardiovasc endurance [facility] 10'L1 [facility] 10'L1 [facility] 10'L1 [facility] 10'L1    Knee flexion on step     [address]'[place] & Leg Lift    Wall Sit c Tball  2x10 5"hold 2x10 5"hold 2x10 5"hold  2x10 5"hold   Leg Press  105#DL  85#SL  2x10ea  105#DL  85#SL  2x10ea 105#DL  85#SL  2x10ea  105#DL  85#SL  2x10ea           Instructed HEP & education                        Gait Training 11/13 11/14 11/16 11/22 11/24                   Modalities  11/13 11/14 11/16 11/22 11/24    10'MH to R knee in seated  10'MH to R knee in seated 10'MH to R knee in seated 10'MH to   R knee in seated 10'MH to R knee in seated

## 2023-11-24 ENCOUNTER — OFFICE VISIT (OUTPATIENT)
Dept: PHYSICAL THERAPY | Facility: CLINIC | Age: 43
End: 2023-11-24
Payer: OTHER GOVERNMENT

## 2023-11-24 DIAGNOSIS — M17.11 PRIMARY OSTEOARTHRITIS OF RIGHT KNEE: Primary | ICD-10-CM

## 2023-11-24 PROCEDURE — 97112 NEUROMUSCULAR REEDUCATION: CPT

## 2023-11-24 PROCEDURE — 97110 THERAPEUTIC EXERCISES: CPT

## 2023-11-24 PROCEDURE — 97140 MANUAL THERAPY 1/> REGIONS: CPT

## 2023-11-24 NOTE — PROGRESS NOTES
Daily Note     Today's date: 2023  Patient name: Son Velasco  : 1980  MRN: [id number]  Referring provider: Dennis Glass MD  Dx:   Encounter Diagnosis     ICD-10-CM    1. Primary osteoarthritis of right knee  M17.11                      Subjective: Patient reports to PT ready to begin her program.  Patient demonstrates improved gait as she begins her PT this morning. Objective: See treatment diary below      Assessment: Tolerated treatment well. Patient exhibited good technique with therapeutic exercises and would benefit from continued PT to increase R knee ROM/strength and endurance to improve mobility and gait. Patient able to complete full revolutions on the [facility] w/o having to warm up. Patient demonstrates good tolerance and increased R knee AROM with the progression of her program.      Plan: Continue per plan of care.       Precautions: R TKR  POC Expiration: 23    Manuals    PROM 15'c STM to R quad & HS 15'c STM to R distal quad  15'c STM to R distal quad 15'c STM to R distal quad 10' c STM & neuromusculare ed   Patellar Mobility     5'   Scar STM        Bilateral HS, hip ABD, piriformis, gastroc, and quad with manual hip traction                  Neuro Re-Ed    Quad set prone  Prone R knee flexion stretch c strap 5' Prone R knee flexion stretch c strap 5'     Fwd,Lat Lunge High Lunge a mat w/ knee flexion hold High lunge at mat 3' 2x10ea F&L 2x10ea F&L Modified Fwd Lunge c L knee on [id number] & Retro Lunge [id number]   BOSU Squat to [facility] Pokey 3' 3'      Kneeling  5' c prolonged flex hold               TherEx    NuStep to improve ROM/cardiovasc endurance [facility] 10'L1 [facility] 10'L1 [facility] 10'L1  [facility] 10'   full revs   Knee flexion on step    10'[facility] & Leg Lift  5' [place]    Wall Sit c Tball  2x10 5"hold 2x10 5"hold  2x10 5"hold    Leg Press  105#DL  85#SL  2x10ea 105#DL  85#SL  2x10ea  105#DL  85#SL  2x10ea            Instructed HEP & education                        Gait Training 11/14 11/16 11/22 11/24 11/27                   Modalities  11/14 11/16 11/22 11/24 11/27    10'MH to R knee in seated 10'MH to R knee in seated 10'MH to   R knee in seated 10'MH to R knee in seated 15'MH to Rknee in seated

## 2023-11-27 ENCOUNTER — OFFICE VISIT (OUTPATIENT)
Dept: PHYSICAL THERAPY | Facility: CLINIC | Age: 43
End: 2023-11-27
Payer: OTHER GOVERNMENT

## 2023-11-27 DIAGNOSIS — M17.11 PRIMARY OSTEOARTHRITIS OF RIGHT KNEE: Primary | ICD-10-CM

## 2023-11-27 PROCEDURE — 97140 MANUAL THERAPY 1/> REGIONS: CPT

## 2023-11-27 PROCEDURE — 97112 NEUROMUSCULAR REEDUCATION: CPT

## 2023-11-27 PROCEDURE — 97110 THERAPEUTIC EXERCISES: CPT

## 2023-11-29 NOTE — PROGRESS NOTES
Daily Note     Today's date: 2023  Patient name: Jasmin Mccabe  : 1980  MRN: [id number]  Referring provider: Aristides Piña MD  Dx:   Encounter Diagnosis     ICD-10-CM    1. Primary osteoarthritis of right knee  M17.11                      Subjective: Patient reports that she is now able to complete full revolutions on her spin bike at home. "I have been having a pretty good week this week with my knee."      Objective: See treatment diary below      Assessment: Tolerated treatment well. Patient exhibited good technique with therapeutic exercises and would benefit from continued PT to increase R knee ROM/strength and endurance to improve mobility and gait. Patient demonstrates improvement with her R knee mobility while performing her therapeutic ex program and work conditioning exs. Plan: Continue per plan of care.       Precautions: R TKR  POC Expiration: 23    Manuals    PROM 15'c STM to R distal quad  15'c STM to R distal quad 15'c STM to R distal quad 10' c STM & neuromusculare ed 10' c STM & nueromuscular re ed   Patellar Mobility    5' 5'   Scar STM        Bilateral HS, hip ABD, piriformis, gastroc, and quad with manual hip traction                  Neuro Re-Ed    Quad set prone Prone R knee flexion stretch c strap 5' Prone R knee flexion stretch c strap 5'      Fwd,Lat Lunge High lunge at mat 3' 2x10ea F&L 2x10ea F&L Modified Fwd Lunge c L knee on [id number] & Retro Lunge [id number] Modified Fwd Lunge c L knee on foam 10x & Retro Lunge [id number]   BOSU Squat to Mat        BOSU March        [facility] [place] 3'       Kneeling                 TherEx    NuStep to improve ROM/cardiovasc endurance [facility] 10'L1 [facility] 10'L1  [facility] 10'   full revs [facility] 10' full revs   Knee flexion on step   10'[facility] & Leg Lift  5' [facility]  5'[facility] bilat   Wall Sit c Tball  2x10 5"hold  2x10 5"hold  2x10 5"hold   Leg Press  105#DL  85#SL  2x10ea  105#DL  85#SL  2x10ea  115#DL  85#SL  2x10ea           Instructed HEP & education                        Gait Training 11/16 11/22 11/24 11/27 11/30                   Modalities  11/16 11/22 11/24 11/27 11/30    10'MH to R knee in seated 10'MH to   R knee in seated 10'MH to R knee in seated 15'MH to Rknee in seated 15'MH to   R knee in seated

## 2023-11-30 ENCOUNTER — OFFICE VISIT (OUTPATIENT)
Dept: PHYSICAL THERAPY | Facility: CLINIC | Age: 43
End: 2023-11-30
Payer: OTHER GOVERNMENT

## 2023-11-30 DIAGNOSIS — M17.11 PRIMARY OSTEOARTHRITIS OF RIGHT KNEE: Primary | ICD-10-CM

## 2023-11-30 PROCEDURE — 97110 THERAPEUTIC EXERCISES: CPT

## 2023-11-30 PROCEDURE — 97140 MANUAL THERAPY 1/> REGIONS: CPT

## 2023-11-30 PROCEDURE — 97112 NEUROMUSCULAR REEDUCATION: CPT

## 2023-12-07 ENCOUNTER — OFFICE VISIT (OUTPATIENT)
Dept: PHYSICAL THERAPY | Facility: CLINIC | Age: 43
End: 2023-12-07
Payer: OTHER GOVERNMENT

## 2023-12-07 DIAGNOSIS — M17.11 PRIMARY OSTEOARTHRITIS OF RIGHT KNEE: Primary | ICD-10-CM

## 2023-12-07 PROCEDURE — 97140 MANUAL THERAPY 1/> REGIONS: CPT

## 2023-12-07 PROCEDURE — 97535 SELF CARE MNGMENT TRAINING: CPT

## 2023-12-07 PROCEDURE — 97110 THERAPEUTIC EXERCISES: CPT

## 2023-12-07 NOTE — PROGRESS NOTES
Daily Note     Today's date: 2023  Patient name: Keo Carroll  : 1980  MRN: [id number]  Referring provider: Richar Corcoran MD  Dx:   Encounter Diagnosis     ICD-10-CM    1. Primary osteoarthritis of right knee  M17.11                      Subjective: Patient reports that she has trouble descending stairs because of stiffness in her R knee. "I felt so stiff over the weekend and the beginning of this week, my knee is moving better today."  Patient reports that she was ill over the weekend and beginning of the week as well. Patient's f/u appt with sx was rescheduled to this coming  since she was not feeling well enough to attend appt this past Monday. Objective: See treatment diary below      Assessment: Tolerated treatment well. Patient exhibited good technique with therapeutic exercises and would benefit from continued PT to increase R knee ROM/strength and endurance to improve mobility and gait. Patient educated on and demonstrated a good understanding of her updated HEP. Will continue as per  orders post Monday's f/u visit. Plan: Continue as per  orders.      Precautions: R TKR  POC Expiration: 23    Manuals    PROM 15'c STM to R distal quad 15'c STM to R distal quad 10' c STM & neuromusculare ed 10' c STM & neuromuscular re ed 15' c STM & neuromuscular re ed   Patellar Mobility   5' 5'    Scar STM        Bilateral HS, hip ABD, piriformis, gastroc, and quad with manual hip traction                  Neuro Re-Ed    Quad set prone Prone R knee flexion stretch c strap 5'       Fwd,Lat Lunge 2x10ea F&L 2x10ea F&L Modified Fwd Lunge c L knee on [id number] & Retro Lunge [id number] Modified Fwd Lunge c L knee on foam 10x & Retro Lunge [id number] Modified Fwd Lunge L knee on foam 10x   BOSU Squat to [employer]  [facility]        Kneeling                 TherEx    NuStep to improve ROM/cardiovasc endurance [facility] 10'L1  [facility] 10'   full revs [facility] 10' full revs [facility] 10' full revs   Knee flexion on step  10'[facility] & Leg Lift  5' [place]  5'[place] bilat 5'[place] bilat   Wall Sit c Tball   2x10 5"hold  2x10 5"hold 2x10 5"hold   Leg Press   105#DL  85#SL  2x10ea  115#DL  85#SL  2x10ea            Instructed HEP & education     20'                   Gait Training 11/22 11/24 11/27 11/30 12/7                   Modalities  11/22 11/24 11/27 11/30 12/7    10'MH to   R knee in seated 10'MH to R knee in seated 15'MH to Rknee in seated 15'MH to   R knee in seated 10'MH to R knee in seated

## 2023-12-14 NOTE — PROGRESS NOTES
Pt contacted office and received clearance from referring provider to d/c PT. Will be d/c from [address] [place] [address].

## 2025-01-29 ENCOUNTER — OFFICE VISIT (OUTPATIENT)
Dept: URGENT CARE | Facility: CLINIC | Age: 45
End: 2025-01-29
Payer: OTHER GOVERNMENT

## 2025-01-29 ENCOUNTER — APPOINTMENT (OUTPATIENT)
Dept: RADIOLOGY | Facility: CLINIC | Age: 45
End: 2025-01-29
Payer: OTHER GOVERNMENT

## 2025-01-29 ENCOUNTER — RESULTS FOLLOW-UP (OUTPATIENT)
Dept: URGENT CARE | Facility: CLINIC | Age: 45
End: 2025-01-29

## 2025-01-29 ENCOUNTER — TELEPHONE (OUTPATIENT)
Dept: URGENT CARE | Facility: CLINIC | Age: 45
End: 2025-01-29

## 2025-01-29 VITALS
DIASTOLIC BLOOD PRESSURE: 102 MMHG | HEART RATE: 99 BPM | HEIGHT: 63 IN | OXYGEN SATURATION: 97 % | SYSTOLIC BLOOD PRESSURE: 158 MMHG | TEMPERATURE: 100.9 F | BODY MASS INDEX: 35.97 KG/M2 | WEIGHT: 203 LBS | RESPIRATION RATE: 22 BRPM

## 2025-01-29 DIAGNOSIS — R05.1 ACUTE COUGH: Primary | ICD-10-CM

## 2025-01-29 DIAGNOSIS — R05.1 ACUTE COUGH: ICD-10-CM

## 2025-01-29 LAB
SARS-COV-2 AG UPPER RESP QL IA: NEGATIVE
VALID CONTROL: NORMAL

## 2025-01-29 PROCEDURE — 87811 SARS-COV-2 COVID19 W/OPTIC: CPT | Performed by: PHYSICIAN ASSISTANT

## 2025-01-29 PROCEDURE — 71046 X-RAY EXAM CHEST 2 VIEWS: CPT

## 2025-01-29 PROCEDURE — G0463 HOSPITAL OUTPT CLINIC VISIT: HCPCS | Performed by: PHYSICIAN ASSISTANT

## 2025-01-29 PROCEDURE — 99213 OFFICE O/P EST LOW 20 MIN: CPT | Performed by: PHYSICIAN ASSISTANT

## 2025-01-29 RX ORDER — DOXYCYCLINE 100 MG/1
100 TABLET ORAL 2 TIMES DAILY
Qty: 14 TABLET | Refills: 0 | Status: SHIPPED | OUTPATIENT
Start: 2025-01-29 | End: 2025-02-05

## 2025-01-29 RX ORDER — FLUTICASONE PROPIONATE 50 MCG
2 SPRAY, SUSPENSION (ML) NASAL DAILY
COMMUNITY
Start: 2025-01-17

## 2025-01-29 RX ORDER — ALBUTEROL SULFATE 90 UG/1
2 INHALANT RESPIRATORY (INHALATION) EVERY 6 HOURS PRN
COMMUNITY
Start: 2025-01-17

## 2025-01-29 RX ORDER — BUPROPION HYDROCHLORIDE 300 MG/1
300 TABLET ORAL EVERY MORNING
COMMUNITY
Start: 2025-01-13

## 2025-01-29 NOTE — TELEPHONE ENCOUNTER
2n call  was completed. Patient was told to continue antibiotics. IF patient is unable to break fever or continued high blood pressure go to ED

## 2025-01-29 NOTE — TELEPHONE ENCOUNTER
Chest Xray- Hazy right infrahilar opacity which may represent atelectasis versus pneumonia. Consider short-term follow-up radiograph to ensure resolution.     VM was left. Patient was prescribed Doxycycline

## 2025-01-29 NOTE — PATIENT INSTRUCTIONS
Patient was educated on acute cough. Patient was educated on antibiotics. Patient was told any chest pain, shortness of breath or worsening of symptoms go to ED.      Educated on high blood pressure    Educated to take OTC Tylenol for fever    Follow up with PCP

## 2025-01-29 NOTE — PROGRESS NOTES
[facility]        NAME: Lisa Onofre is a 44 y.o. female  : 1980    MRN: [id number]  DATE: 2025  TIME: 11:04 AM    Assessment and Plan   Acute cough [R05.1]  1. Acute cough  Poct Covid 19 Rapid Antigen Test    XR chest pa and lateral    doxycycline (ADOXA) 100 MG tablet        Chest Xray- Possible pneumonia pending radiology report.  Educated on high blood pressure  Rapid COVID 19- Negative    Patient was told to eat on antibiotics. Patient was told happy to send Tessalon Perles in however if they are currently being shipped to her script may be declined. Recommend OTC Delsym    Patient Instructions   Patient was educated on acute cough. Patient was educated on antibiotics. Patient was told any chest pain, shortness of breath or worsening of symptoms go to ED.      Educated on high blood pressure    Educated to take OTC Tylenol for fever    Follow up with PCP    Follow up with PCP in 3-5 days.  Proceed to  ER if symptoms worsen.    If tests have been performed at [facility], our office will contact you with results if changes need to be made to the care plan discussed with you at the visit.  You can review your full results on [facility].    Chief Complaint     Chief Complaint   Patient presents with    Cold Like Symptoms     Patient presents with symptoms beginning a month ago which she was seen for. Patient has a productive cough, headaches, fevers, and congestion. Patient was on prednisone, flonase, and an albuterol inhaler. Patient also presents with worsening headaches.          History of Present Illness       Patient is a 44 year old female who presents today complaining of cough , congestion and sore throat. Patient reports cough started. 1 month ago. Patient did see [facility] on 25 and was prescribed albuterol, Flonase and prednisone with no relief. Admits allergy to Meloxicam.        Review of Systems   Review of Systems   Constitutional:  Positive for fatigue and  "fever.   HENT:  Positive for congestion, sinus pressure, sinus pain and sore throat.    Respiratory:  Positive for shortness of breath and wheezing.    Cardiovascular: Negative.    Psychiatric/Behavioral: Negative.           Current Medications       Current Outpatient Medications:     albuterol (PROVENTIL HFA,VENTOLIN HFA) 90 mcg/act inhaler, Inhale 2 puffs every 6 (six) hours as needed, Disp: , Rfl:     buPROPion (WELLBUTRIN XL) 300 mg 24 hr tablet, Take 300 mg by mouth every morning, Disp: , Rfl:     doxycycline (ADOXA) 100 MG tablet, Take 1 tablet (100 mg total) by mouth 2 (two) times a day for 7 days, Disp: 14 tablet, Rfl: 0    fluticasone (FLONASE) 50 mcg/act nasal spray, 2 sprays into each nostril daily, Disp: , Rfl:     metFORMIN (GLUCOPHAGE) 500 mg tablet, , Disp: , Rfl:     omeprazole (PriLOSEC) 20 mg delayed release capsule, , Disp: , Rfl:     ondansetron (ZOFRAN) 4 mg tablet, Take 1 tablet (4 mg total) by mouth every 8 (eight) hours as needed for nausea or vomiting (Patient not taking: Reported on 1/29/2025), Disp: 20 tablet, Rfl: 0    Current Allergies     Allergies as of 01/29/2025 - Reviewed 01/29/2025   Allergen Reaction Noted    Meloxicam Swelling 01/11/2019            The following portions of the patient's history were reviewed and updated as appropriate: allergies, current medications, past family history, past medical history, past social history, past surgical history and problem list.     History reviewed. No pertinent past medical history.    Past Surgical History:   Procedure Laterality Date    REPLACEMENT TOTAL KNEE Right 2023       History reviewed. No pertinent family history.      Medications have been verified.        Objective   BP (!) 158/102   Pulse 99   Temp (!) 100.9 °F (38.3 °C) (Tympanic)   Resp 22   Ht 5' 3\" (1.6 m)   Wt 92.1 kg (203 lb)   SpO2 97%   BMI 35.96 kg/m²   No LMP recorded.       Physical Exam     Physical Exam  Vitals and nursing note reviewed. "   Constitutional:       Appearance: Normal appearance.   HENT:      Head: Normocephalic.      Comments: Pressure but no pain over frontal and maxillary sinus     Nose: Nose normal.      Mouth/Throat:      Mouth: Mucous membranes are moist.      Comments: PND  Eyes:      Extraocular Movements: Extraocular movements intact.      Pupils: Pupils are equal, round, and reactive to light.   Cardiovascular:      Rate and Rhythm: Normal rate and regular rhythm.      Heart sounds: Normal heart sounds.   Pulmonary:      Breath sounds: Wheezing present.   Neurological:      General: No focal deficit present.      Mental Status: She is alert and oriented to person, place, and time.   Psychiatric:         Mood and Affect: Mood normal.         Behavior: Behavior normal.